# Patient Record
Sex: MALE | Race: WHITE | NOT HISPANIC OR LATINO | Employment: OTHER | URBAN - METROPOLITAN AREA
[De-identification: names, ages, dates, MRNs, and addresses within clinical notes are randomized per-mention and may not be internally consistent; named-entity substitution may affect disease eponyms.]

---

## 2017-01-07 ENCOUNTER — HOSPITAL ENCOUNTER (EMERGENCY)
Facility: MEDICAL CENTER | Age: 67
End: 2017-01-07
Attending: EMERGENCY MEDICINE
Payer: MEDICARE

## 2017-01-07 ENCOUNTER — APPOINTMENT (OUTPATIENT)
Dept: RADIOLOGY | Facility: MEDICAL CENTER | Age: 67
End: 2017-01-07
Attending: EMERGENCY MEDICINE
Payer: MEDICARE

## 2017-01-07 VITALS
SYSTOLIC BLOOD PRESSURE: 129 MMHG | DIASTOLIC BLOOD PRESSURE: 81 MMHG | OXYGEN SATURATION: 97 % | BODY MASS INDEX: 24.52 KG/M2 | TEMPERATURE: 98.3 F | HEIGHT: 72 IN | WEIGHT: 181 LBS | RESPIRATION RATE: 16 BRPM | HEART RATE: 67 BPM

## 2017-01-07 DIAGNOSIS — R51.9 ACUTE NONINTRACTABLE HEADACHE, UNSPECIFIED HEADACHE TYPE: ICD-10-CM

## 2017-01-07 PROCEDURE — 70450 CT HEAD/BRAIN W/O DYE: CPT

## 2017-01-07 PROCEDURE — 700102 HCHG RX REV CODE 250 W/ 637 OVERRIDE(OP): Performed by: EMERGENCY MEDICINE

## 2017-01-07 PROCEDURE — A9270 NON-COVERED ITEM OR SERVICE: HCPCS | Performed by: EMERGENCY MEDICINE

## 2017-01-07 PROCEDURE — 99283 EMERGENCY DEPT VISIT LOW MDM: CPT

## 2017-01-07 RX ORDER — BUTALBITAL, ACETAMINOPHEN AND CAFFEINE 50; 325; 40 MG/1; MG/1; MG/1
1 TABLET ORAL ONCE
Status: COMPLETED | OUTPATIENT
Start: 2017-01-07 | End: 2017-01-07

## 2017-01-07 RX ORDER — BUTALBITAL, ACETAMINOPHEN AND CAFFEINE 300; 40; 50 MG/1; MG/1; MG/1
1-2 CAPSULE ORAL EVERY 6 HOURS PRN
Qty: 12 CAP | Refills: 0 | Status: ON HOLD | OUTPATIENT
Start: 2017-01-07 | End: 2018-04-21

## 2017-01-07 RX ADMIN — BUTALBITAL, ACETAMINOPHEN, AND CAFFEINE 1 TABLET: 50; 325; 40 TABLET ORAL at 17:47

## 2017-01-07 ASSESSMENT — PAIN SCALES - GENERAL: PAINLEVEL_OUTOF10: ASSUMED PAIN PRESENT

## 2017-01-07 NOTE — ED AVS SNAPSHOT
1/7/2017          [address] [address]  [place] [address]    Dear Honorio:    [facility] wants to ensure your discharge home is safe and you or your loved ones have had all your questions answered regarding your care after you leave the hospital.    You may receive a telephone call within two days of your discharge.  This call is to make certain you understand your discharge instructions as well as ensure we provided you with the best care possible during your stay with us.     The call will only last approximately 3-5 minutes and will be done by a nurse.    Once again, we want to ensure your discharge home is safe and that you have a clear understanding of any next steps in your care.  If you have any questions or concerns, please do not hesitate to contact us, we are here for you.  Thank you for choosing [facility] for your healthcare needs.    Sincerely,    oLgan Dudley    [facility]

## 2017-01-07 NOTE — ED AVS SNAPSHOT
[employer] Access Code: [email or web address]  Expires: 2/6/2017  5:52 PM    Your email address is not on file at [facility].  Email Addresses are required for you to sign up for [employer], please contact [phone] to verify your personal information and to provide your email address prior to attempting to register for [employer].    Honorio Kimbrough  [address] [address]  [place] [address]    [employer]  A secure, online tool to manage your health information     [employer]’s [employer]® is a secure, online tool that connects you to your personalized health information from the privacy of your home -- day or night - making it very easy for you to manage your healthcare. Once the activation process is completed, you can even access your medical information using the [employer] susan, which is available for free in the Apple Susan store or Google Play store.     To learn more about [employer], visit [email or web address]    There are two levels of access available (as shown below):   My Chart Features  [facility] Primary Care Doctor [facility]  Specialists [facility]  Urgent  Care Non-[facility] Primary Care Doctor   Email your healthcare team securely and privately 24/7 X X X    Manage appointments: schedule your next appointment; view details of past/upcoming appointments X      Request prescription refills. X      View recent personal medical records, including lab and immunizations X X X X   View health record, including health history, allergies, medications X X X X   Read reports about your outpatient visits, procedures, consult and ER notes X X X X   See your discharge summary, which is a recap of your hospital and/or ER visit that includes your diagnosis, lab results, and care plan X X  X     How to register for [employer]:  Once your e-mail address has been verified, follow the following steps to sign up for [employer].     1. Go to  [email or web address]  2. Click on the Sign Up Now box, which takes you to the New Member Sign Up page. You will need to  provide the following information:  a. Enter your [employer] Access Code exactly as it appears at the top of this page. (You will not need to use this code after you’ve completed the sign-up process. If you do not sign up before the expiration date, you must request a new code.)   b. Enter your date of birth.   c. Enter your home email address.   d. Click Submit, and follow the next screen’s instructions.  3. Create a [employer] ID. This will be your [employer] login ID and cannot be changed, so think of one that is secure and easy to remember.  4. Create a [employer] password. You can change your password at any time.  5. Enter your Password Reset Question and Answer. This can be used at a later time if you forget your password.   6. Enter your e-mail address. This allows you to receive e-mail notifications when new information is available in [employer].  7. Click Sign Up. You can now view your health information.    For assistance activating your [employer] account, call [phone]

## 2017-01-08 NOTE — DISCHARGE INSTRUCTIONS
Return to the ER for fever, increased pain, vomiting, weakness, numbness, or other concerns  Follow-up with Dr. Rios or establish care with Dr. Lagunas from family medicine--call them on Monday to set up an appointment for Monday or Tuesday  Take Fioricet as prescribed and as needed for headache      General Headache Without Cause  A headache is pain or discomfort felt around the head or neck area. The specific cause of a headache may not be found. There are many causes and types of headaches. A few common ones are:  · Tension headaches.  · Migraine headaches.  · Cluster headaches.  · Chronic daily headaches.  HOME CARE INSTRUCTIONS   · Keep all follow-up appointments with your health care provider or any specialist referral.  · Only take over-the-counter or prescription medicines for pain or discomfort as directed by your health care provider.  · Lie down in a dark, quiet room when you have a headache.  · Keep a headache journal to find out what may trigger your migraine headaches. For example, write down:  ¨ What you eat and drink.  ¨ How much sleep you get.  ¨ Any change to your diet or medicines.  · Try massage or other relaxation techniques.  · Put ice packs or heat on the head and neck. Use these 3 to 4 times per day for 15 to 20 minutes each time, or as needed.  · Limit stress.  · Sit up straight, and do not tense your muscles.  · Quit smoking if you smoke.  · Limit alcohol use.  · Decrease the amount of caffeine you drink, or stop drinking caffeine.  · Eat and sleep on a regular schedule.  · Get 7 to 9 hours of sleep, or as recommended by your health care provider.  · Keep lights dim if bright lights bother you and make your headaches worse.  SEEK MEDICAL CARE IF:   · You have problems with the medicines you were prescribed.  · Your medicines are not working.  · You have a change from the usual headache.  · You have nausea or vomiting.  SEEK IMMEDIATE MEDICAL CARE IF:   · Your headache becomes  severe.  · You have a fever.  · You have a stiff neck.  · You have loss of vision.  · You have muscular weakness or loss of muscle control.  · You start losing your balance or have trouble walking.  · You feel faint or pass out.  · You have severe symptoms that are different from your first symptoms.     This information is not intended to replace advice given to you by your health care provider. Make sure you discuss any questions you have with your health care provider.     Document Released: 12/18/2006 Document Revised: 05/03/2016 Document Reviewed: 01/02/2013  [employer] Interactive Patient Education ©2016 [employer]

## 2017-01-08 NOTE — ED NOTES
Pt D/C to home. D/C instructions and prescriptions given to patient. Pt requesting tetnus shot, pt told to go to the health department for tetnus. Pt verbalizes understanding and has no further questions. Pt leaves ED with no acute changes, complaints or concerns.

## 2017-01-08 NOTE — ED PROVIDER NOTES
"ED Provider Note    CHIEF COMPLAINT  Chief Complaint   Patient presents with   • Head Ache       HPI  Honorio Kimbrough is a 66 y.o. male who presents complaining of headache.    Patient reports gradual onset of headache in the right frontal and facial area that feels pressure-like/heavy. Pain is nonradiating, mild to moderate, and without aggravating factors. He has taken one aspirin yesterday and one aspirin today without relief. He denies trauma, nausea, vomiting, new visual changes, fever, chills, neck stiffness, photophobia, weakness, chest pain, shortness of breath.    Patient states he has a daily headache \"before I take a shit.\"  This headache is different. He denies thunderclap headache.    Patient mentions he is here with his friend who has a foot infection and decided to be evaluated while he was here.    ALLERGIES  No Known Allergies    CURRENT MEDICATIONS  Home Medications     Reviewed by Jagdeep Morin (Pharmacy Tech) on 01/07/17 at 1631  Med List Status: Complete    Medication Last Dose Status          Patient Hernando Taking any Medications                        PAST MEDICAL HISTORY   has a past medical history of Cholesterol serum elevated; Enlarged tonsils; and Hearing disorder (5/20/2009).    SURGICAL HISTORY   has past surgical history that includes other orthopedic surgery and other.    SOCIAL HISTORY  Social History     Social History Main Topics   • Smoking status: Never Smoker    • Smokeless tobacco: Never Used   • Alcohol Use: Yes      Comment: occasional   • Drug Use: No   • Sexual Activity: Not on file       REVIEW OF SYSTEMS  See HPI for further details.  All other systems are negative except as above in HPI.    PHYSICAL EXAM  VITAL SIGNS: /81 mmHg  Pulse 67  Temp(Src) 36.8 °C (98.3 °F)  Resp 16  Ht 1.829 m (6')  Wt 82.1 kg (181 lb)  BMI 24.54 kg/m2  SpO2 97%    General:  WDWN, nontoxic appearing in NAD; A+Ox3; V/S as above  Skin: warm and dry; good color; no rash  HEENT: " NCAT; EOMs intact; PERRL; no scleral icterus; hearing aids noted  Neck: FROM; no meningismus, no LAD  Cardiovascular: Regular heart rate and rhythm.  No murmurs, rubs, or gallops; pulses 2+ bilaterally radially   Lungs: Clear to auscultation with good air movement bilaterally.  No wheezes, rhonchi, or rales.   Abdomen: BS present; soft; NTND; no rebound, guarding, or rigidity.  No organomegaly or pulsatile mass   Extremities: NOEL x 4; no e/o trauma; no pedal edema  Neurologic: CNs III-XII formally intact except for hearing deficit; speech intelligible, slightly impaired due to hearing impairment; distal sensation intact; strength 5/5 UE/LEs  Psychiatric: Appropriate affect, normal mood    IMAGING  CT-HEAD W/O   Final Result      1.  No acute intracranial abnormality.   2.  LEFT cochlear implant present with prior LEFT mastoidectomy.   3.  Fluid present in the remaining LEFT mastoid air cells, likely inflammatory and similar to prior.          MEDICAL RECORD  I have reviewed patient's medical record and pertinent results are listed below.      COURSE & MEDICAL DECISION MAKING  I have reviewed any medical record information, laboratory studies and radiographic results as noted.    oHnorio Kimbrough is a 66 y.o. male who presents complaining of headache. This was not thunderclap headache and has been present since yesterday. Patient does have headaches on a daily basis but this one was more constant. No focal neurologic findings or symptoms that are new for the patient. He has no fever, meningismus, or history of trauma.    CT head was obtained to evaluate for intracranial hemorrhage or space-occupying lesion. There is no acute abnormality present. Patient does not require LP, MRI or CTA imaging at this time.    We will discharge home with referral to his PCP or to establish care with Dr. Lagunas.  I will give him a prescription for Fioricet and have discussed return precautions including vomiting, fever, increased pain, or  any new neurologic symptoms.    Pt's blood pressure was noted to be above 120/80 here in the ER.  Pt was informed and advised to follow up as an outpatient for recheck for possible dx/management of hypertension.    I have reviewed the patient's narcotic Rx record.      FINAL IMPRESSION  1. Acute nonintractable headache, unspecified headache type        Electronically signed by: Lluvia Oquendo, 1/7/2017 4:23 PM

## 2017-01-08 NOTE — ED NOTES
Pt resting in bed. Pt given medications per MD order. Pt asking for a work note for yesterday and today, Verified with Dr. Rhodes that we cannot do that. Work note written for today.

## 2017-01-08 NOTE — ED NOTES
Pt states that he has had a headache on the right side of his head for the past 2 days. Pt has taken ASA but it has not helped. Pt states he has never had a headache last this long. Pt denies any numbness or tingling, denies any blurred or double vision that is out of the norm. No further needs noted. Pt placed in gown and given a warm blanket.

## 2017-01-09 ENCOUNTER — OFFICE VISIT (OUTPATIENT)
Dept: MEDICAL GROUP | Facility: CLINIC | Age: 67
End: 2017-01-09
Payer: MEDICARE

## 2017-01-09 VITALS
RESPIRATION RATE: 16 BRPM | TEMPERATURE: 97.6 F | OXYGEN SATURATION: 96 % | BODY MASS INDEX: 24.24 KG/M2 | DIASTOLIC BLOOD PRESSURE: 60 MMHG | HEIGHT: 72 IN | WEIGHT: 179 LBS | SYSTOLIC BLOOD PRESSURE: 152 MMHG | HEART RATE: 80 BPM

## 2017-01-09 DIAGNOSIS — R51.9 NONINTRACTABLE EPISODIC HEADACHE, UNSPECIFIED HEADACHE TYPE: ICD-10-CM

## 2017-01-09 DIAGNOSIS — R06.83 SNORING: ICD-10-CM

## 2017-01-09 DIAGNOSIS — Z12.11 COLON CANCER SCREENING: ICD-10-CM

## 2017-01-09 DIAGNOSIS — R52 PAIN: ICD-10-CM

## 2017-01-09 DIAGNOSIS — J34.2 DEVIATED SEPTUM: ICD-10-CM

## 2017-01-09 DIAGNOSIS — Z12.5 PROSTATE CANCER SCREENING: ICD-10-CM

## 2017-01-09 DIAGNOSIS — Z13.220 SCREENING, LIPID: ICD-10-CM

## 2017-01-09 PROCEDURE — 99213 OFFICE O/P EST LOW 20 MIN: CPT | Performed by: NURSE PRACTITIONER

## 2017-01-09 NOTE — MR AVS SNAPSHOT
Honorio Kimbrough   2017 1:00 PM   Office Visit   MRN: [id number]    Department:  [facility]   Dept Phone:  [phone]    Description:  Male : 1950   Provider:  KAREN Liao           Reason for Visit     Hospital Follow-up d/c [facility] for headache      Allergies as of 2017     No Known Allergies      You were diagnosed with     Nonintractable episodic headache, unspecified headache type   [9003633]       Snoring   [351887]       Deviated septum   [604711]       Prostate cancer screening   [149110]       Screening, lipid   [956665]       Pain   [782512]       Colon cancer screening   [512025]         Vital Signs     Blood Pressure Pulse Temperature Respirations Height Weight    152/60 mmHg 80 36.4 °C (97.6 °F) 16 1.829 m (6') 81.194 kg (179 lb)    Body Mass Index Oxygen Saturation Smoking Status             24.27 kg/m2 96% Never Smoker          Basic Information     Date Of Birth Sex Race Ethnicity Preferred Language    1950 Male White Non- English      Problem List              ICD-10-CM Priority Class Noted - Resolved    Hearing disorder (Chronic) H93.299   2009 - Present    Nonintractable episodic headache R51   2017 - Present    Snoring R06.83   2017 - Present      Health Maintenance        Date Due Completion Dates    COLONOSCOPY 5/10/2001 ---    IMM DTaP/Tdap/Td Vaccine (1 - Tdap) 2008    IMM ZOSTER VACCINE 5/10/2011 ---    IMM PNEUMOCOCCAL 65+ (ADULT) LOW/MEDIUM RISK SERIES (1 of 2 - PCV13) 5/10/2016 ---    IMM INFLUENZA (1) 2016 ---            Current Immunizations     TD Vaccine 2008 11:23 PM      Below and/or attached are the medications your provider expects you to take. Review all of your home medications and newly ordered medications with your provider and/or pharmacist. Follow medication instructions as directed by your provider and/or pharmacist. Please keep your medication list with you and share with  your provider. Update the information when medications are discontinued, doses are changed, or new medications (including over-the-counter products) are added; and carry medication information at all times in the event of emergency situations     Allergies:  No Known Allergies          Medications  Valid as of: January 09, 2017 -  1:38 PM    Generic Name Brand Name Tablet Size Instructions for use    Butalbital-APAP-Caffeine (Cap) FIORICET -40 MG Take 1-2 Caps by mouth every 6 hours as needed for Headache.        .                 Medicines prescribed today were sent to:     [facility] [address] [place] [address], [place] - [address] [place] [address]    Phone: [phone] Fax: [phone]    Open 24 Hours?: No      Medication refill instructions:       If your prescription bottle indicates you have medication refills left, it is not necessary to call your provider’s office. Please contact your pharmacy and they will refill your medication.    If your prescription bottle indicates you do not have any refills left, you may request refills at any time through one of the following ways: The online [employer] system (except Urgent Care), by calling your provider’s office, or by asking your pharmacy to contact your provider’s office with a refill request. Medication refills are processed only during regular business hours and may not be available until the next business day. Your provider may request additional information or to have a follow-up visit with you prior to refilling your medication.   *Please Note: Medication refills are assigned a new Rx number when refilled electronically. Your pharmacy may indicate that no refills were authorized even though a new prescription for the same medication is available at the pharmacy. Please request the medicine by name with the pharmacy before contacting your provider for a refill.        Your To Do List     Future Labs/Procedures Complete By Expires    CBC WITH  DIFFERENTIAL  As directed 1/10/2018    COMP METABOLIC PANEL  As directed 1/10/2018    LIPID PROFILE  As directed 1/10/2018    OCCULT BLOOD FECES IMMUNOASSAY  As directed 1/10/2018    PROSTATE SPECIFIC AG SCREENING  As directed 1/10/2018    TSH  As directed 1/10/2018      Referral     A referral request has been sent to our patient care coordination department. Please allow 3-5 business days for us to process this request and contact you either by phone or mail. If you do not hear from us by the 5th business day, please call us at [phone].           MyChart Status: Patient Declined

## 2017-01-09 NOTE — PROGRESS NOTES
"CC: Hospital Follow-up        HPI:     Honorio presents today for the followin. Nonintractable episodic headache, unspecified headache type/Pain   Here for ER follow-up. States that he eaten some Chinese food which included salmon with a lot of \"sauce on it\". States the next morning he woke up with a headache most significant on the right side and he felt he had associated numbness on his face. Was driving his friend to the ER so he went along as well and had negative CT of the head was diagnosed with a routine headache. Given Fioricet. This helped his headache greatly. Was given a prescription for small supply which she has not needed to fill. He states on routine he wakes up with headaches and fill them as he sleeping as well. He does snore. To his knowledge he may or may not of then tested for sleep apnea previously but if he has been up was decades ago.  Symptoms are chronic. Not new. Occasionally associated with tingling or numbness on the right side of face. He has been several hospitals for these symptoms. One of which was in [place]  Headaches are bilateral, occasionally worse on the right side. No significant changes in vision. As stated he is had 2 negative CT/MRIs of his head. [facility] at [facility] for similar symptoms. I do not have access to these records.    He does seem to think that this could be related to elevated blood pressure. He does not monitor his blood pressure at home. He does not have access to a blood pressure cuff. His blood pressures have been normal in the office. They were normal in the emergency room as well.    Headaches self resolve after he has a bowel movement in the morning    3. Snoring/ Deviated septum  As stated above patient has significant snoring issues. He attributes this to a deviated septum when his nose is broken several decades ago at work. He does seem to be fairly focused on wanting to fix his nose        Current Outpatient Prescriptions "   Medication Sig Dispense Refill   • acetaminophen/caffeine/butalbital 300-40-50 mg (FIORICET) -40 MG Cap capsule Take 1-2 Caps by mouth every 6 hours as needed for Headache. 12 Cap 0     No current facility-administered medications for this visit.     Social History   Substance Use Topics   • Smoking status: Never Smoker    • Smokeless tobacco: Never Used   • Alcohol Use: Yes      Comment: occasional     I reviewed patients allergies, problem list and medications today in EPIC.    ROS: Any/all pertinent positives listed in the HPI, otherwise all others reviewed are negative today.      /60 mmHg  Pulse 80  Temp(Src) 36.4 °C (97.6 °F)  Resp 16  Ht 1.829 m (6')  Wt 81.194 kg (179 lb)  BMI 24.27 kg/m2  SpO2 96%    Exam:   Gen: Alert and oriented, No apparent distress. WDWN  Psych: A+Ox3, normal affect and mood  Skin: Warm, dry and intact. Good turgor   No rashes in visible areas.  Eye: Conjunctiva clear, lids normal  ENMT: Lips without lesions, good dentition  Lungs: Unlabored respiratory effort.   Normal facial movement  Cochlear implant and hearing aid      Assessment and Plan.   66 y.o. male with the following issues.    1. Nonintractable episodic headache, unspecified headache type  Stable. Currently asymptomatic.  - COMP METABOLIC PANEL; Future  - CBC WITH DIFFERENTIAL; Future  - TSH; Future    2. Pain   As above  - TSH; Future    3. Snoring  /Deviated septum  Referral to ENT to evaluate for surgical intervention for his deviated septum. Referral for overnight desaturation study  - REFERRAL TO ENT    5. Prostate cancer screening  Ordered  - PROSTATE SPECIFIC AG SCREENING; Future    6. Screening, lipid  Ordered  - LIPID PROFILE; Future    7. Colon cancer screening  Ordered  - OCCULT BLOOD FECES IMMUNOASSAY; Future

## 2017-01-10 ENCOUNTER — HOSPITAL ENCOUNTER (OUTPATIENT)
Dept: LAB | Facility: MEDICAL CENTER | Age: 67
End: 2017-01-10
Attending: NURSE PRACTITIONER
Payer: MEDICARE

## 2017-01-10 DIAGNOSIS — Z13.220 SCREENING, LIPID: ICD-10-CM

## 2017-01-10 DIAGNOSIS — R52 PAIN: ICD-10-CM

## 2017-01-10 DIAGNOSIS — R51.9 NONINTRACTABLE EPISODIC HEADACHE, UNSPECIFIED HEADACHE TYPE: ICD-10-CM

## 2017-01-10 DIAGNOSIS — Z12.5 PROSTATE CANCER SCREENING: ICD-10-CM

## 2017-01-10 LAB
ALBUMIN SERPL BCP-MCNC: 4.6 G/DL (ref 3.2–4.9)
ALBUMIN/GLOB SERPL: 1.4 G/DL
ALP SERPL-CCNC: 55 U/L (ref 30–99)
ALT SERPL-CCNC: 21 U/L (ref 2–50)
ANION GAP SERPL CALC-SCNC: 6 MMOL/L (ref 0–11.9)
AST SERPL-CCNC: 19 U/L (ref 12–45)
BASOPHILS # BLD AUTO: 0.06 K/UL (ref 0–0.12)
BASOPHILS NFR BLD AUTO: 1.2 % (ref 0–1.8)
BILIRUB SERPL-MCNC: 0.9 MG/DL (ref 0.1–1.5)
BUN SERPL-MCNC: 17 MG/DL (ref 8–22)
CALCIUM SERPL-MCNC: 9.5 MG/DL (ref 8.5–10.5)
CHLORIDE SERPL-SCNC: 102 MMOL/L (ref 96–112)
CHOLEST SERPL-MCNC: 230 MG/DL (ref 100–199)
CO2 SERPL-SCNC: 29 MMOL/L (ref 20–33)
CREAT SERPL-MCNC: 1.13 MG/DL (ref 0.5–1.4)
EOSINOPHIL # BLD: 0.16 K/UL (ref 0–0.51)
EOSINOPHIL NFR BLD AUTO: 3.1 % (ref 0–6.9)
ERYTHROCYTE [DISTWIDTH] IN BLOOD BY AUTOMATED COUNT: 49.5 FL (ref 35.9–50)
GLOBULIN SER CALC-MCNC: 3.4 G/DL (ref 1.9–3.5)
GLUCOSE SERPL-MCNC: 93 MG/DL (ref 65–99)
HCT VFR BLD AUTO: 49.7 % (ref 42–52)
HDLC SERPL-MCNC: 50 MG/DL
HGB BLD-MCNC: 16.7 G/DL (ref 14–18)
IMM GRANULOCYTES # BLD AUTO: 0.01 K/UL (ref 0–0.11)
IMM GRANULOCYTES NFR BLD AUTO: 0.2 % (ref 0–0.9)
LDLC SERPL CALC-MCNC: 162 MG/DL
LYMPHOCYTES # BLD: 1.37 K/UL (ref 1–4.8)
LYMPHOCYTES NFR BLD AUTO: 26.8 % (ref 22–41)
MCH RBC QN AUTO: 34.6 PG (ref 27–33)
MCHC RBC AUTO-ENTMCNC: 33.6 G/DL (ref 33.7–35.3)
MCV RBC AUTO: 102.9 FL (ref 81.4–97.8)
MONOCYTES # BLD: 0.47 K/UL (ref 0–0.85)
MONOCYTES NFR BLD AUTO: 9.2 % (ref 0–13.4)
NEUTROPHILS # BLD: 3.05 K/UL (ref 1.82–7.42)
NEUTROPHILS NFR BLD AUTO: 59.5 % (ref 44–72)
NRBC # BLD AUTO: 0 K/UL
NRBC BLD-RTO: 0 /100 WBC
PLATELET # BLD AUTO: 167 K/UL (ref 164–446)
PMV BLD AUTO: 11.3 FL (ref 9–12.9)
POTASSIUM SERPL-SCNC: 4.1 MMOL/L (ref 3.6–5.5)
PROT SERPL-MCNC: 8 G/DL (ref 6–8.2)
PSA SERPL DL<=0.01 NG/ML-MCNC: 1.55 NG/ML (ref 0–4)
RBC # BLD AUTO: 4.83 M/UL (ref 4.7–6.1)
SODIUM SERPL-SCNC: 137 MMOL/L (ref 135–145)
TRIGL SERPL-MCNC: 91 MG/DL (ref 0–149)
TSH SERPL DL<=0.005 MIU/L-ACNC: 3.28 UIU/ML (ref 0.3–3.7)
WBC # BLD AUTO: 5.1 K/UL (ref 4.8–10.8)

## 2017-01-10 PROCEDURE — 85025 COMPLETE CBC W/AUTO DIFF WBC: CPT

## 2017-01-10 PROCEDURE — 80061 LIPID PANEL: CPT | Mod: GA

## 2017-01-10 PROCEDURE — 84443 ASSAY THYROID STIM HORMONE: CPT

## 2017-01-10 PROCEDURE — 36415 COLL VENOUS BLD VENIPUNCTURE: CPT

## 2017-01-10 PROCEDURE — 80053 COMPREHEN METABOLIC PANEL: CPT

## 2017-01-10 PROCEDURE — 84153 ASSAY OF PSA TOTAL: CPT

## 2017-01-17 ENCOUNTER — TELEPHONE (OUTPATIENT)
Dept: MEDICAL GROUP | Facility: CLINIC | Age: 67
End: 2017-01-17

## 2017-01-17 ENCOUNTER — OFFICE VISIT (OUTPATIENT)
Dept: MEDICAL GROUP | Facility: CLINIC | Age: 67
End: 2017-01-17
Payer: MEDICARE

## 2017-01-17 VITALS
SYSTOLIC BLOOD PRESSURE: 136 MMHG | OXYGEN SATURATION: 98 % | RESPIRATION RATE: 20 BRPM | DIASTOLIC BLOOD PRESSURE: 78 MMHG | TEMPERATURE: 98.4 F | HEIGHT: 72 IN | WEIGHT: 177 LBS | BODY MASS INDEX: 23.98 KG/M2 | HEART RATE: 105 BPM

## 2017-01-17 DIAGNOSIS — R51.9 CHRONIC NONINTRACTABLE HEADACHE, UNSPECIFIED HEADACHE TYPE: ICD-10-CM

## 2017-01-17 DIAGNOSIS — R06.83 SNORING: ICD-10-CM

## 2017-01-17 DIAGNOSIS — E78.5 HYPERLIPIDEMIA, UNSPECIFIED HYPERLIPIDEMIA TYPE: ICD-10-CM

## 2017-01-17 DIAGNOSIS — G89.29 CHRONIC NONINTRACTABLE HEADACHE, UNSPECIFIED HEADACHE TYPE: ICD-10-CM

## 2017-01-17 DIAGNOSIS — R51.9 NONINTRACTABLE EPISODIC HEADACHE, UNSPECIFIED HEADACHE TYPE: ICD-10-CM

## 2017-01-17 PROCEDURE — 99214 OFFICE O/P EST MOD 30 MIN: CPT | Performed by: NURSE PRACTITIONER

## 2017-01-17 RX ORDER — AMITRIPTYLINE HYDROCHLORIDE 25 MG/1
25 TABLET, FILM COATED ORAL
Qty: 30 TAB | Refills: 5 | Status: SHIPPED | OUTPATIENT
Start: 2017-01-17 | End: 2018-04-20 | Stop reason: CLARIF

## 2017-01-17 NOTE — Clinical Note
January 17, 2017         Patient: Honorio Kimbrough   YOB: 1950   Date of Visit: 1/17/2017           To Whom it May Concern:    Honorio Kimbrough was seen in my clinic on 1/17/2017. He is able to drive.    If you have any questions or concerns, please don't hesitate to call.        Sincerely,           KAREN Liao  Electronically Signed

## 2017-01-17 NOTE — PROGRESS NOTES
"CC: Follow-Up        HPI:     Honorio is a patient of Dr. Rios presents today for the followin. Nonintractable episodic headache, unspecified headache type/Chronic nonintractable headache, unspecified headache type  He is here today following up for headaches. He tells me he has daily headache. Often starts in the middle the night and improves after he has a bowel movement. He then also tells me that he has a headache after every meal?   Of note, He is a somewhat of a poor historian terms of history, presentation etc. regarding his headaches. No reports of new or alarming symptoms.  He did have a negative CT scan of the head and  in the ER. He also still needs had a normal head scan at [facility] recently as well. He was given Fioricet however he is not taking it because he wants to see what will happen with his headaches without it.  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 He drives vehicles part-time for work. I do not see that he was told to stop driving. He denies that he was told to stop driving.  He did this because he felt that his headaches were bothersome.     He believes his headaches are related to blood pressure. He has been unable to monitor his blood pressure at home  She describes the pain as had his \"pressure\". Seems to be globalized throughout the head.      3. Snoring  We discussed his snoring. This was discussed at last visit as well. 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       Current Outpatient Prescriptions   Medication Sig Dispense Refill   • amitriptyline (ELAVIL) 25 MG Tab Take 1 Tab by mouth every bedtime. 30 Tab 5   • acetaminophen/caffeine/butalbital 300-40-50 mg (FIORICET) -40 MG Cap capsule Take 1-2 Caps by mouth every " "6 hours as needed for Headache. 12 Cap 0     No current facility-administered medications for this visit.     Social History   Substance Use Topics   • Smoking status: Never Smoker    • Smokeless tobacco: Never Used   • Alcohol Use: Yes      Comment: occasional     I reviewed patients allergies, problem list and medications today in [facility].    ROS: Any/all pertinent positives listed in the HPI, otherwise all others reviewed are negative today.      /78 mmHg  Pulse 105  Temp(Src) 36.9 °C (98.4 °F)  Resp 20  Ht 1.829 m (6' 0.01\")  Wt 80.287 kg (177 lb)  BMI 24.00 kg/m2  SpO2 98%    Exam:   Gen: Alert and oriented, No apparent distress. WDWN  Psych: A+Ox3, normal affect and mood  Skin: Warm, dry and intact. Good turgor   No rashes in visible areas.  Eye: Conjunctiva clear, lids normal  ENMT: Lips without lesions, dentures  Lungs: Clear to auscultation bilaterally, no rales or rhonchi   Unlabored respiratory effort.   CV: Regular rate and rhythm, S1, S2. No murmurs.   No Edema  Patient does have distorted speech which is chronic related to a hearing impediment. Normal facial movement.  Congenital Hearing loss. Cochlear implant      Assessment and Plan.   66 y.o. male with the following issues.    1. Nonintractable episodic headache, unspecified headache type/Chronic nonintractable headache, unspecified headache type  Stable. Chronic. Unknown etiology. Negative CT scan of the head. Trial of low-dose amitriptyline. He's given a headache diary-discussed the importance of being able to paint a clear picture at his follow-up. I will refer him to neurology. He does have a prescription for a few or set to try and I do encourage him to try it.  Reassurance blood pressure normal today.   - amitriptyline (ELAVIL) 25 MG Tab; Take 1 Tab by mouth every bedtime.  Dispense: 30 Tab; Refill: 5  - REFERRAL TO NEUROLOGY    3. Snoring  Will evaluate for sleep apnea.     3. Hyperlipidemia  Stable. We did discuss diet and " exercise. Recommend rechecking in 3-6 month with his PCP to evaluate if he needs medication management

## 2017-01-17 NOTE — TELEPHONE ENCOUNTER
Left message to let pt know that his letter for work is ready for  at . We can also mail it to him if he wishes.

## 2017-01-17 NOTE — MR AVS SNAPSHOT
"        Honorio Kimbrough   2017 12:20 PM   Office Visit   MRN: [id number]    Department:  [facility]   Dept Phone:  [phone]    Description:  Male : 1950   Provider:  KAREN Liao           Reason for Visit     Follow-Up [facility] to labs       Allergies as of 2017     No Known Allergies      You were diagnosed with     Nonintractable episodic headache, unspecified headache type   [5113099]       Chronic nonintractable headache, unspecified headache type   [5405765]       Snoring   [[id number]]         Vital Signs     Blood Pressure Pulse Temperature Respirations Height Weight    136/78 mmHg 105 36.9 °C (98.4 °F) 20 1.829 m (6' 0.01\") 80.287 kg (177 lb)    Body Mass Index Oxygen Saturation Smoking Status             24.00 kg/m2 98% Never Smoker          Basic Information     Date Of Birth Sex Race Ethnicity Preferred Language    1950 Male White Non- English      Problem List              ICD-10-CM Priority Class Noted - Resolved    Hearing disorder (Chronic) H93.299   2009 - Present    Nonintractable episodic headache R51   2017 - Present    Snoring R06.83   2017 - Present      Health Maintenance        Date Due Completion Dates    COLONOSCOPY 5/10/2001 ---    IMM DTaP/Tdap/Td Vaccine (1 - Tdap) 2008    IMM ZOSTER VACCINE 5/10/2011 ---    IMM PNEUMOCOCCAL 65+ (ADULT) LOW/MEDIUM RISK SERIES (1 of 2 - PCV13) 5/10/2016 ---    IMM INFLUENZA (1) 2016 ---            Current Immunizations     TD Vaccine 2008 11:23 PM      Below and/or attached are the medications your provider expects you to take. Review all of your home medications and newly ordered medications with your provider and/or pharmacist. Follow medication instructions as directed by your provider and/or pharmacist. Please keep your medication list with you and share with your provider. Update the information when medications are discontinued, doses are changed, or new medications " (including over-the-counter products) are added; and carry medication information at all times in the event of emergency situations     Allergies:  No Known Allergies          Medications  Valid as of: January 17, 2017 -  1:30 PM    Generic Name Brand Name Tablet Size Instructions for use    Amitriptyline HCl (Tab) ELAVIL 25 MG Take 1 Tab by mouth every bedtime.        Butalbital-APAP-Caffeine (Cap) FIORICET -40 MG Take 1-2 Caps by mouth every 6 hours as needed for Headache.        .                 Medicines prescribed today were sent to:     [facility] [address] [place] [address], [place] - [address] [place] [address]    Phone: [phone] Fax: [phone]    Open 24 Hours?: No      Medication refill instructions:       If your prescription bottle indicates you have medication refills left, it is not necessary to call your provider’s office. Please contact your pharmacy and they will refill your medication.    If your prescription bottle indicates you do not have any refills left, you may request refills at any time through one of the following ways: The online [employer] system (except Urgent Care), by calling your provider’s office, or by asking your pharmacy to contact your provider’s office with a refill request. Medication refills are processed only during regular business hours and may not be available until the next business day. Your provider may request additional information or to have a follow-up visit with you prior to refilling your medication.   *Please Note: Medication refills are assigned a new Rx number when refilled electronically. Your pharmacy may indicate that no refills were authorized even though a new prescription for the same medication is available at the pharmacy. Please request the medicine by name with the pharmacy before contacting your provider for a refill.        Referral     A referral request has been sent to our patient care coordination department. Please allow 3-5  business days for us to process this request and contact you either by phone or mail. If you do not hear from us by the 5th business day, please call us at [phone].           MyChart Status: Patient Declined

## 2017-01-18 ENCOUNTER — TELEPHONE (OUTPATIENT)
Dept: MEDICAL GROUP | Facility: CLINIC | Age: 67
End: 2017-01-18

## 2017-01-18 NOTE — TELEPHONE ENCOUNTER
1. 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  He would like to get it faxed to [phone] Recipient Felisa Wilkins. He is hard of hearing and would prefer it to be faxed

## 2017-01-20 ENCOUNTER — TELEPHONE (OUTPATIENT)
Dept: MEDICAL GROUP | Facility: CLINIC | Age: 67
End: 2017-01-20

## 2017-01-20 NOTE — Clinical Note
January 20, 2017        Patient: Honorio Kimbrough   YOB: 1950   Date of Visit: 1/20/2017           To Whom It May Concern:    Honorio Kimbrough was seen in my clinic on 1/17/2017. He is able to drive commercial vehicles.    If you have any questions or concerns, please don't hesitate to call.        Sincerely,          Wayne Rios D.O.      [address]  [facility] [id number] [phone] (Phone)  [phone] (Fax)

## 2017-01-20 NOTE — TELEPHONE ENCOUNTER
1. Caller Name: Honorio Hanson                      Call Back Number: [phone] (home)     2. 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 Otherwise his job doesn't let him back to work. Please refax to  [phone] Recipient Felisa Wilkins    3. Patient approves office to leave a detailed voicemail/MyChart message: yes

## 2017-01-23 ENCOUNTER — TELEPHONE (OUTPATIENT)
Dept: MEDICAL GROUP | Facility: CLINIC | Age: 67
End: 2017-01-23

## 2017-01-23 NOTE — TELEPHONE ENCOUNTER
Phone Number Called: [phone]       Message: Lm for Ekta Barahona (patient's boss) asking to please call us back to see what else is needed for patient to go back to work.  Previous letter not enough.    Left Message for patient to call back: yes

## 2017-02-07 ENCOUNTER — TELEPHONE (OUTPATIENT)
Dept: MEDICAL GROUP | Facility: CLINIC | Age: 67
End: 2017-02-07

## 2017-02-07 NOTE — TELEPHONE ENCOUNTER
1. Caller Name: Kiel A +                                             Patient refusing apnea study at this time due to being homeless

## 2018-04-20 ENCOUNTER — RESOLUTE PROFESSIONAL BILLING HOSPITAL PROF FEE (OUTPATIENT)
Dept: HOSPITALIST | Facility: MEDICAL CENTER | Age: 68
End: 2018-04-20

## 2018-04-20 ENCOUNTER — HOSPITAL ENCOUNTER (INPATIENT)
Facility: MEDICAL CENTER | Age: 68
LOS: 1 days | DRG: 307 | End: 2018-04-22
Attending: EMERGENCY MEDICINE | Admitting: HOSPITALIST
Payer: MEDICARE

## 2018-04-20 ENCOUNTER — OFFICE VISIT (OUTPATIENT)
Dept: URGENT CARE | Facility: CLINIC | Age: 68
End: 2018-04-20

## 2018-04-20 ENCOUNTER — APPOINTMENT (OUTPATIENT)
Dept: RADIOLOGY | Facility: MEDICAL CENTER | Age: 68
DRG: 307 | End: 2018-04-20
Attending: EMERGENCY MEDICINE
Payer: MEDICARE

## 2018-04-20 ENCOUNTER — APPOINTMENT (OUTPATIENT)
Dept: RADIOLOGY | Facility: IMAGING CENTER | Age: 68
End: 2018-04-20
Attending: PHYSICIAN ASSISTANT

## 2018-04-20 VITALS
BODY MASS INDEX: 24.79 KG/M2 | TEMPERATURE: 99.3 F | RESPIRATION RATE: 16 BRPM | WEIGHT: 183 LBS | DIASTOLIC BLOOD PRESSURE: 80 MMHG | HEART RATE: 95 BPM | SYSTOLIC BLOOD PRESSURE: 120 MMHG | OXYGEN SATURATION: 95 % | HEIGHT: 72 IN

## 2018-04-20 DIAGNOSIS — J90 PLEURAL EFFUSION: ICD-10-CM

## 2018-04-20 DIAGNOSIS — R06.02 SOB (SHORTNESS OF BREATH): ICD-10-CM

## 2018-04-20 DIAGNOSIS — J90 PLEURAL EFFUSION ON RIGHT: Primary | ICD-10-CM

## 2018-04-20 DIAGNOSIS — R06.00 DYSPNEA, UNSPECIFIED TYPE: ICD-10-CM

## 2018-04-20 PROBLEM — D53.9 MACROCYTIC ANEMIA: Status: ACTIVE | Noted: 2018-04-20

## 2018-04-20 PROBLEM — I71.21 ANEURYSM OF ASCENDING AORTA (HCC): Status: ACTIVE | Noted: 2018-04-20

## 2018-04-20 PROBLEM — N17.9 AKI (ACUTE KIDNEY INJURY) (HCC): Status: ACTIVE | Noted: 2018-04-20

## 2018-04-20 LAB
ALBUMIN SERPL BCP-MCNC: 3.7 G/DL (ref 3.2–4.9)
ALBUMIN/GLOB SERPL: 1.4 G/DL
ALP SERPL-CCNC: 62 U/L (ref 30–99)
ALT SERPL-CCNC: 42 U/L (ref 2–50)
ANION GAP SERPL CALC-SCNC: 3 MMOL/L (ref 0–11.9)
APPEARANCE UR: CLEAR
AST SERPL-CCNC: 30 U/L (ref 12–45)
BASOPHILS # BLD AUTO: 0.9 % (ref 0–1.8)
BASOPHILS # BLD: 0.04 K/UL (ref 0–0.12)
BILIRUB SERPL-MCNC: 0.8 MG/DL (ref 0.1–1.5)
BILIRUB UR QL STRIP.AUTO: NEGATIVE
BNP SERPL-MCNC: 1264 PG/ML (ref 0–100)
BUN SERPL-MCNC: 26 MG/DL (ref 8–22)
CALCIUM SERPL-MCNC: 8.5 MG/DL (ref 8.4–10.2)
CHLORIDE SERPL-SCNC: 108 MMOL/L (ref 96–112)
CHLORIDE UR-SCNC: 124 MMOL/L
CO2 SERPL-SCNC: 24 MMOL/L (ref 20–33)
COLOR UR: YELLOW
CREAT SERPL-MCNC: 1.34 MG/DL (ref 0.5–1.4)
CREAT UR-MCNC: 6.3 MG/DL
EKG 4674: NORMAL
EKG IMPRESSION: NORMAL
EOSINOPHIL # BLD AUTO: 0.11 K/UL (ref 0–0.51)
EOSINOPHIL NFR BLD: 2.4 % (ref 0–6.9)
ERYTHROCYTE [DISTWIDTH] IN BLOOD BY AUTOMATED COUNT: 53.1 FL (ref 35.9–50)
GLOBULIN SER CALC-MCNC: 2.7 G/DL (ref 1.9–3.5)
GLUCOSE SERPL-MCNC: 107 MG/DL (ref 65–99)
GLUCOSE UR STRIP.AUTO-MCNC: NEGATIVE MG/DL
HCT VFR BLD AUTO: 37.1 % (ref 42–52)
HGB BLD-MCNC: 12.6 G/DL (ref 14–18)
IMM GRANULOCYTES # BLD AUTO: 0.01 K/UL (ref 0–0.11)
IMM GRANULOCYTES NFR BLD AUTO: 0.2 % (ref 0–0.9)
KETONES UR STRIP.AUTO-MCNC: NEGATIVE MG/DL
LEUKOCYTE ESTERASE UR QL STRIP.AUTO: NEGATIVE
LYMPHOCYTES # BLD AUTO: 1.16 K/UL (ref 1–4.8)
LYMPHOCYTES NFR BLD: 25.1 % (ref 22–41)
MCH RBC QN AUTO: 34.4 PG (ref 27–33)
MCHC RBC AUTO-ENTMCNC: 34 G/DL (ref 33.7–35.3)
MCV RBC AUTO: 101.4 FL (ref 81.4–97.8)
MICRO URNS: NORMAL
MONOCYTES # BLD AUTO: 0.42 K/UL (ref 0–0.85)
MONOCYTES NFR BLD AUTO: 9.1 % (ref 0–13.4)
NEUTROPHILS # BLD AUTO: 2.89 K/UL (ref 1.82–7.42)
NEUTROPHILS NFR BLD: 62.3 % (ref 44–72)
NITRITE UR QL STRIP.AUTO: NEGATIVE
NRBC # BLD AUTO: 0 K/UL
NRBC BLD-RTO: 0 /100 WBC
PH UR STRIP.AUTO: 5 [PH]
PLATELET # BLD AUTO: 165 K/UL (ref 164–446)
PMV BLD AUTO: 11 FL (ref 9–12.9)
POTASSIUM SERPL-SCNC: 3.8 MMOL/L (ref 3.6–5.5)
POTASSIUM UR-SCNC: 8.5 MMOL/L
PROT SERPL-MCNC: 6.4 G/DL (ref 6–8.2)
PROT UR QL STRIP: NEGATIVE MG/DL
PROT UR-MCNC: <4 MG/DL (ref 0–15)
RBC # BLD AUTO: 3.66 M/UL (ref 4.7–6.1)
RBC UR QL AUTO: NEGATIVE
SODIUM SERPL-SCNC: 135 MMOL/L (ref 135–145)
SODIUM UR-SCNC: 118 MMOL/L
SP GR UR STRIP.AUTO: <=1.005
TROPONIN I SERPL-MCNC: 0.04 NG/ML (ref 0–0.04)
TSH SERPL DL<=0.005 MIU/L-ACNC: 3.3 UIU/ML (ref 0.38–5.33)
WBC # BLD AUTO: 4.6 K/UL (ref 4.8–10.8)

## 2018-04-20 PROCEDURE — 700111 HCHG RX REV CODE 636 W/ 250 OVERRIDE (IP): Performed by: INTERNAL MEDICINE

## 2018-04-20 PROCEDURE — 82436 ASSAY OF URINE CHLORIDE: CPT

## 2018-04-20 PROCEDURE — 93000 ELECTROCARDIOGRAM COMPLETE: CPT | Performed by: PHYSICIAN ASSISTANT

## 2018-04-20 PROCEDURE — 93010 ELECTROCARDIOGRAM REPORT: CPT | Performed by: INTERNAL MEDICINE

## 2018-04-20 PROCEDURE — 700117 HCHG RX CONTRAST REV CODE 255: Performed by: EMERGENCY MEDICINE

## 2018-04-20 PROCEDURE — 71046 X-RAY EXAM CHEST 2 VIEWS: CPT | Mod: TC,FY | Performed by: PHYSICIAN ASSISTANT

## 2018-04-20 PROCEDURE — 94760 N-INVAS EAR/PLS OXIMETRY 1: CPT

## 2018-04-20 PROCEDURE — 84156 ASSAY OF PROTEIN URINE: CPT

## 2018-04-20 PROCEDURE — 93005 ELECTROCARDIOGRAM TRACING: CPT | Performed by: INTERNAL MEDICINE

## 2018-04-20 PROCEDURE — 80053 COMPREHEN METABOLIC PANEL: CPT

## 2018-04-20 PROCEDURE — 81003 URINALYSIS AUTO W/O SCOPE: CPT

## 2018-04-20 PROCEDURE — G0378 HOSPITAL OBSERVATION PER HR: HCPCS

## 2018-04-20 PROCEDURE — 89190 NASAL SMEAR FOR EOSINOPHILS: CPT

## 2018-04-20 PROCEDURE — 96374 THER/PROPH/DIAG INJ IV PUSH: CPT

## 2018-04-20 PROCEDURE — 99215 OFFICE O/P EST HI 40 MIN: CPT | Performed by: PHYSICIAN ASSISTANT

## 2018-04-20 PROCEDURE — 85025 COMPLETE CBC W/AUTO DIFF WBC: CPT

## 2018-04-20 PROCEDURE — 71275 CT ANGIOGRAPHY CHEST: CPT

## 2018-04-20 PROCEDURE — 82570 ASSAY OF URINE CREATININE: CPT

## 2018-04-20 PROCEDURE — 84484 ASSAY OF TROPONIN QUANT: CPT

## 2018-04-20 PROCEDURE — 84300 ASSAY OF URINE SODIUM: CPT

## 2018-04-20 PROCEDURE — 83880 ASSAY OF NATRIURETIC PEPTIDE: CPT

## 2018-04-20 PROCEDURE — 84133 ASSAY OF URINE POTASSIUM: CPT

## 2018-04-20 PROCEDURE — 700111 HCHG RX REV CODE 636 W/ 250 OVERRIDE (IP)

## 2018-04-20 PROCEDURE — 84443 ASSAY THYROID STIM HORMONE: CPT

## 2018-04-20 PROCEDURE — 99285 EMERGENCY DEPT VISIT HI MDM: CPT

## 2018-04-20 PROCEDURE — 99220 PR INITIAL OBSERVATION CARE,LEVL III: CPT | Performed by: INTERNAL MEDICINE

## 2018-04-20 RX ORDER — LISINOPRIL 5 MG/1
10 TABLET ORAL
Status: DISCONTINUED | OUTPATIENT
Start: 2018-04-21 | End: 2018-04-21

## 2018-04-20 RX ORDER — FUROSEMIDE 10 MG/ML
INJECTION INTRAMUSCULAR; INTRAVENOUS
Status: COMPLETED
Start: 2018-04-20 | End: 2018-04-20

## 2018-04-20 RX ORDER — AMOXICILLIN 250 MG
2 CAPSULE ORAL 2 TIMES DAILY
Status: DISCONTINUED | OUTPATIENT
Start: 2018-04-20 | End: 2018-04-22 | Stop reason: HOSPADM

## 2018-04-20 RX ORDER — FUROSEMIDE 10 MG/ML
20 INJECTION INTRAMUSCULAR; INTRAVENOUS
Status: DISCONTINUED | OUTPATIENT
Start: 2018-04-20 | End: 2018-04-22

## 2018-04-20 RX ORDER — ONDANSETRON 2 MG/ML
4 INJECTION INTRAMUSCULAR; INTRAVENOUS EVERY 4 HOURS PRN
Status: DISCONTINUED | OUTPATIENT
Start: 2018-04-20 | End: 2018-04-22 | Stop reason: HOSPADM

## 2018-04-20 RX ORDER — POLYETHYLENE GLYCOL 3350 17 G/17G
1 POWDER, FOR SOLUTION ORAL
Status: DISCONTINUED | OUTPATIENT
Start: 2018-04-20 | End: 2018-04-22 | Stop reason: HOSPADM

## 2018-04-20 RX ORDER — BISACODYL 10 MG
10 SUPPOSITORY, RECTAL RECTAL
Status: DISCONTINUED | OUTPATIENT
Start: 2018-04-20 | End: 2018-04-22 | Stop reason: HOSPADM

## 2018-04-20 RX ORDER — ONDANSETRON 4 MG/1
4 TABLET, ORALLY DISINTEGRATING ORAL EVERY 4 HOURS PRN
Status: DISCONTINUED | OUTPATIENT
Start: 2018-04-20 | End: 2018-04-22 | Stop reason: HOSPADM

## 2018-04-20 RX ORDER — HEPARIN SODIUM 5000 [USP'U]/ML
5000 INJECTION, SOLUTION INTRAVENOUS; SUBCUTANEOUS EVERY 8 HOURS
Status: DISCONTINUED | OUTPATIENT
Start: 2018-04-20 | End: 2018-04-22 | Stop reason: HOSPADM

## 2018-04-20 RX ADMIN — HEPARIN SODIUM 5000 UNITS: 5000 INJECTION, SOLUTION INTRAVENOUS; SUBCUTANEOUS at 22:31

## 2018-04-20 RX ADMIN — IOHEXOL 100 ML: 350 INJECTION, SOLUTION INTRAVENOUS at 19:47

## 2018-04-20 RX ADMIN — FUROSEMIDE 20 MG: 10 INJECTION, SOLUTION INTRAMUSCULAR; INTRAVENOUS at 21:38

## 2018-04-20 ASSESSMENT — ENCOUNTER SYMPTOMS
MYALGIAS: 0
BACK PAIN: 0
COUGH: 0
VOMITING: 0
FEVER: 0
EYE DISCHARGE: 0
STRIDOR: 0
SEIZURES: 0
HEARTBURN: 0
EYE PAIN: 0
NECK PAIN: 0
CHILLS: 0
SPUTUM PRODUCTION: 0
INSOMNIA: 0
PALPITATIONS: 0
DIARRHEA: 0
ABDOMINAL PAIN: 0
NAUSEA: 0
DEPRESSION: 0
EYE REDNESS: 0
HEADACHES: 0
FOCAL WEAKNESS: 0
ORTHOPNEA: 0
BLURRED VISION: 0
WEIGHT LOSS: 0
SHORTNESS OF BREATH: 1
NERVOUS/ANXIOUS: 0
DIZZINESS: 0

## 2018-04-20 ASSESSMENT — PAIN SCALES - GENERAL
PAINLEVEL_OUTOF10: 0
PAINLEVEL_OUTOF10: 0

## 2018-04-20 ASSESSMENT — PATIENT HEALTH QUESTIONNAIRE - PHQ9
1. LITTLE INTEREST OR PLEASURE IN DOING THINGS: NOT AT ALL
SUM OF ALL RESPONSES TO PHQ9 QUESTIONS 1 AND 2: 0
2. FEELING DOWN, DEPRESSED, IRRITABLE, OR HOPELESS: NOT AT ALL

## 2018-04-20 ASSESSMENT — LIFESTYLE VARIABLES
EVER_SMOKED: NEVER
ALCOHOL_USE: NO

## 2018-04-21 LAB
ANION GAP SERPL CALC-SCNC: 5 MMOL/L (ref 0–11.9)
BUN SERPL-MCNC: 22 MG/DL (ref 8–22)
CALCIUM SERPL-MCNC: 8.6 MG/DL (ref 8.4–10.2)
CHLORIDE SERPL-SCNC: 107 MMOL/L (ref 96–112)
CO2 SERPL-SCNC: 25 MMOL/L (ref 20–33)
CREAT SERPL-MCNC: 1.25 MG/DL (ref 0.5–1.4)
EKG IMPRESSION: NORMAL
EOSINOPHIL URNS QL MICRO: NORMAL
ERYTHROCYTE [DISTWIDTH] IN BLOOD BY AUTOMATED COUNT: 52.7 FL (ref 35.9–50)
GLUCOSE SERPL-MCNC: 86 MG/DL (ref 65–99)
HCT VFR BLD AUTO: 38.7 % (ref 42–52)
HGB BLD-MCNC: 12.9 G/DL (ref 14–18)
LV EJECT FRACT  99904: 55
LV EJECT FRACT MOD 2C 99903: 49.65
LV EJECT FRACT MOD 4C 99902: 46.9
LV EJECT FRACT MOD BP 99901: 48.55
MCH RBC QN AUTO: 33.4 PG (ref 27–33)
MCHC RBC AUTO-ENTMCNC: 33.3 G/DL (ref 33.7–35.3)
MCV RBC AUTO: 100.3 FL (ref 81.4–97.8)
PLATELET # BLD AUTO: 174 K/UL (ref 164–446)
PMV BLD AUTO: 11.2 FL (ref 9–12.9)
POTASSIUM SERPL-SCNC: 3.5 MMOL/L (ref 3.6–5.5)
RBC # BLD AUTO: 3.86 M/UL (ref 4.7–6.1)
SODIUM SERPL-SCNC: 137 MMOL/L (ref 135–145)
WBC # BLD AUTO: 4.8 K/UL (ref 4.8–10.8)

## 2018-04-21 PROCEDURE — 80048 BASIC METABOLIC PNL TOTAL CA: CPT

## 2018-04-21 PROCEDURE — 700111 HCHG RX REV CODE 636 W/ 250 OVERRIDE (IP): Performed by: INTERNAL MEDICINE

## 2018-04-21 PROCEDURE — 99233 SBSQ HOSP IP/OBS HIGH 50: CPT | Performed by: HOSPITALIST

## 2018-04-21 PROCEDURE — 93306 TTE W/DOPPLER COMPLETE: CPT

## 2018-04-21 PROCEDURE — 85027 COMPLETE CBC AUTOMATED: CPT

## 2018-04-21 PROCEDURE — 700102 HCHG RX REV CODE 250 W/ 637 OVERRIDE(OP): Performed by: INTERNAL MEDICINE

## 2018-04-21 PROCEDURE — 700102 HCHG RX REV CODE 250 W/ 637 OVERRIDE(OP): Performed by: HOSPITALIST

## 2018-04-21 PROCEDURE — A9270 NON-COVERED ITEM OR SERVICE: HCPCS | Performed by: HOSPITALIST

## 2018-04-21 PROCEDURE — 93306 TTE W/DOPPLER COMPLETE: CPT | Mod: 26 | Performed by: INTERNAL MEDICINE

## 2018-04-21 PROCEDURE — 770020 HCHG ROOM/CARE - TELE (206)

## 2018-04-21 PROCEDURE — A9270 NON-COVERED ITEM OR SERVICE: HCPCS | Performed by: INTERNAL MEDICINE

## 2018-04-21 RX ORDER — POTASSIUM CHLORIDE 20 MEQ/1
40 TABLET, EXTENDED RELEASE ORAL ONCE
Status: COMPLETED | OUTPATIENT
Start: 2018-04-21 | End: 2018-04-21

## 2018-04-21 RX ORDER — POTASSIUM CHLORIDE 20 MEQ/1
40 TABLET, EXTENDED RELEASE ORAL ONCE
Status: DISCONTINUED | OUTPATIENT
Start: 2018-04-21 | End: 2018-04-21

## 2018-04-21 RX ADMIN — HEPARIN SODIUM 5000 UNITS: 5000 INJECTION, SOLUTION INTRAVENOUS; SUBCUTANEOUS at 20:42

## 2018-04-21 RX ADMIN — DOCUSATE SODIUM AND SENNOSIDES 2 TABLET: 8.6; 5 TABLET, FILM COATED ORAL at 20:40

## 2018-04-21 RX ADMIN — LISINOPRIL 10 MG: 5 TABLET ORAL at 07:53

## 2018-04-21 RX ADMIN — HEPARIN SODIUM 5000 UNITS: 5000 INJECTION, SOLUTION INTRAVENOUS; SUBCUTANEOUS at 15:23

## 2018-04-21 RX ADMIN — METOPROLOL TARTRATE 25 MG: 25 TABLET ORAL at 20:40

## 2018-04-21 RX ADMIN — METOPROLOL TARTRATE 25 MG: 25 TABLET ORAL at 09:18

## 2018-04-21 RX ADMIN — HEPARIN SODIUM 5000 UNITS: 5000 INJECTION, SOLUTION INTRAVENOUS; SUBCUTANEOUS at 06:26

## 2018-04-21 RX ADMIN — POTASSIUM CHLORIDE 40 MEQ: 1500 TABLET, EXTENDED RELEASE ORAL at 09:17

## 2018-04-21 RX ADMIN — FUROSEMIDE 20 MG: 10 INJECTION, SOLUTION INTRAMUSCULAR; INTRAVENOUS at 15:23

## 2018-04-21 RX ADMIN — FUROSEMIDE 20 MG: 10 INJECTION, SOLUTION INTRAMUSCULAR; INTRAVENOUS at 06:26

## 2018-04-21 ASSESSMENT — ENCOUNTER SYMPTOMS
FEVER: 0
HEADACHES: 0
TINGLING: 0
NAUSEA: 0
BLURRED VISION: 0
DIZZINESS: 0
CHILLS: 0
COUGH: 0
BACK PAIN: 0
SORE THROAT: 0
ABDOMINAL PAIN: 0
EYE PAIN: 0
DEPRESSION: 0
SHORTNESS OF BREATH: 1
VOMITING: 0
NECK PAIN: 0
INSOMNIA: 0
PALPITATIONS: 0

## 2018-04-21 ASSESSMENT — PAIN SCALES - GENERAL
PAINLEVEL_OUTOF10: 0
PAINLEVEL_OUTOF10: 2
PAINLEVEL_OUTOF10: 0

## 2018-04-21 NOTE — PROGRESS NOTES
Telemetry Summary    Rhythm Interpretation: Sinus Rhythm  Heart Rate: 79  [place] Interval: 0.16  QRS Interval: 0.08  QT Interval: 0.42

## 2018-04-21 NOTE — PROGRESS NOTES
"Subjective:      Pt is a 67 y.o. male who presents with Shortness of Breath            HPI  Pt notes worsening SOB in the last 2-3 days. Pt states he has this issue from time to time due to a \"small heart valve deformity that was diagnosed at [facility] a year or so ago. Pt states he is very fatigued and SOB with difficulty performing rountine ADLs. Pt denies asthma or COPD or other lung disorders. Pt has not taken any Rx medications for this condition. Pt states the pain is a 5/10, aching in nature and worse at night. Pt denies CP,  NVD, paresthesias, headaches, dizziness, change in vision, hives, or other joint pain. The pt's medication list, problem list, and allergies have been evaluated and reviewed during today's visit.    PMH:  Past Medical History:   Diagnosis Date   • Cholesterol serum elevated     diet and exercise controlled   • Enlarged tonsils     removed   • Hearing disorder 5/20/2009       PSH:  Past Surgical History:   Procedure Laterality Date   • OTHER      coclear implant left ear 1/06   • OTHER ORTHOPEDIC SURGERY      traumatic amputation left middle finger       Fam Hx:  the patient's family history is not pertinent to their current complaint      Soc HX:  Social History     Social History   • Marital status: Single     Spouse name: N/A   • Number of children: N/A   • Years of education: N/A     Occupational History   • Not on file.     Social History Main Topics   • Smoking status: Never Smoker   • Smokeless tobacco: Never Used   • Alcohol use Yes      Comment: occasional   • Drug use: No   • Sexual activity: Not on file     Other Topics Concern   • Not on file     Social History Narrative   • No narrative on file         Medications:    Current Outpatient Prescriptions:   •  amitriptyline (ELAVIL) 25 MG Tab, Take 1 Tab by mouth every bedtime., Disp: 30 Tab, Rfl: 5  •  acetaminophen/caffeine/butalbital 300-40-50 mg (FIORICET) -40 MG Cap capsule, Take 1-2 Caps by mouth every 6 hours as " needed for Headache., Disp: 12 Cap, Rfl: 0      Allergies:  Patient has no known allergies.    ROS  Constitutional: Negative for fever, chills and malaise/fatigue.   HENT: Negative for congestion and sore throat.    Eyes: Negative for blurred vision, double vision and photophobia.   Respiratory: Negative for cough and POS shortness of breath.    Cardiovascular: Negative for chest pain and palpitations.   Gastrointestinal: Negative for heartburn, nausea, vomiting, abdominal pain, diarrhea and constipation.   Genitourinary: Negative for dysuria and flank pain.   Musculoskeletal: Negative for joint pain and myalgias.   Skin: Negative for itching and rash.   Neurological: Negative for dizziness, tingling and headaches.   Endo/Heme/Allergies: Does not bruise/bleed easily.   Psychiatric/Behavioral: Negative for depression. The patient is not nervous/anxious.           Objective:     /80   Pulse 95   Temp 37.4 °C (99.3 °F)   Resp 16   Ht 1.829 m (6')   Wt 83 kg (183 lb)   SpO2 95%   BMI 24.82 kg/m²      Physical Exam       Constitutional: PT is oriented to person, place, and time. PT appears well-developed and well-nourished. No distress.   HENT:   Head: Normocephalic and atraumatic.   Mouth/Throat: Oropharynx is clear and moist. No oropharyngeal exudate.   Eyes: Conjunctivae normal and EOM are normal. Pupils are equal, round, and reactive to light.   Neck: Normal range of motion. Neck supple. No thyromegaly present.   EARS: WNL. Pt wears cochlear implant on left  Cardiovascular: Normal rate, regular rhythm, normal heart sounds and intact distal pulses.  Exam reveals no gallop and no friction rub.  GABRIELLA 2/6  Pulmonary/Chest: Effort normal and breath sounds normal. No respiratory distress. PT has RLL/RML wheezes. PT has no rales. Pt exhibits no tenderness.   Abdominal: Soft. Bowel sounds are normal. PT exhibits no distension and no mass. There is no tenderness. There is no rebound and no guarding.    Musculoskeletal: Normal range of motion. PT exhibits no edema and no tenderness. Pt missing end of 4th digit on left hand  Neurological: PT is alert and oriented to person, place, and time. PT has normal reflexes. No cranial nerve deficit.   Skin: Skin is warm and dry. No rash noted. PT is not diaphoretic. No erythema.       Psychiatric: PT has a normal mood and affect. PT behavior is normal. Judgment and thought content normal.     RADS:  Narrative       4/20/2018 4:11 PM    HISTORY/REASON FOR EXAM:  Shortness of Breath      TECHNIQUE/EXAM DESCRIPTION AND NUMBER OF VIEWS:  Two views of the chest.    COMPARISON:  5/14/2009    FINDINGS:  Cardiac mediastinal contour is unchanged.  Mild blunting of RIGHT costophrenic angle.  No focal consolidation.  No pneumothorax.  No major bony abnormality is seen.   Impression       1.  Small RIGHT pleural effusion.  2.  Stable cardiomegaly.  3.  No pneumonia or pulmonary edema.   Reading Provider Reading Date   Mason Zhang M.D. Apr 20, 2018   Signing Provider Signing Date Signing Time   Mason Zhang M.D. Apr 20, 2018  4:49 PM          Assessment/Plan:     1. Pleural effusion on right      2. SOB (shortness of breath)    - EKG-->LVH, prolonged QT  - DX-CHEST-2 VIEWS; Future    TO [facility] ER NOW FOR further evaluation. Spoke with Dr. Ga on the phone, attending at the ER , and he graciously accepted transfer of care for further imaging and evaluation of the pt.  Pt to go POV to ER now  Rest, fluids encouraged.  AVS with medical info given.  Pt was in full understanding and agreement with the plan.  Follow-up as needed if symptoms worsen or fail to improve.

## 2018-04-21 NOTE — ED PROVIDER NOTES
ED Provider Note  CHIEF COMPLAINT  Chief Complaint   Patient presents with   • Shortness of Breath       HPI  Honorio Kimbrough is a 67 y.o. male who presents for evaluation shortness of breath.. The patient presented to urgent care today with progressive shortness of breath and was found to have a small pleural effusion to his right chest. He was sent to the emergency department for further evaluation. He denies any fever chills chest pain. He's been having progressive shortness of breath over the past 4 months. Denies any history of leg swelling, exertional chest pain, no history of heart failure . exertion makes his symptoms worse. He has a history of a cochlear implant. The patient does not smoke denies use of drugs of abuse    REVIEW OF SYSTEMS  See HPI for further details. All other systems reviewed and negative except as noted above    PAST MEDICAL HISTORY  Past Medical History:   Diagnosis Date   • Cholesterol serum elevated     diet and exercise controlled   • Enlarged tonsils     removed   • Hearing disorder 5/20/2009       FAMILY HISTORY  History reviewed. No pertinent family history.    SOCIAL HISTORY  Social History     Social History   • Marital status: Single     Spouse name: N/A   • Number of children: N/A   • Years of education: N/A     Social History Main Topics   • Smoking status: Never Smoker   • Smokeless tobacco: Never Used   • Alcohol use Yes      Comment: occasional   • Drug use: No   • Sexual activity: Not on file     Other Topics Concern   • Not on file     Social History Narrative   • No narrative on file       SURGICAL HISTORY  Past Surgical History:   Procedure Laterality Date   • OTHER      coclear implant left ear 1/06   • OTHER ORTHOPEDIC SURGERY      traumatic amputation left middle finger       CURRENT MEDICATIONS  Home Medications    **Home medications have not yet been reviewed for this encounter**          ALLERGIES  No Known Allergies    PHYSICAL EXAM  VITAL SIGNS: Pulse 90   Resp  20   Ht 1.829 m (6')   Wt 83 kg (183 lb)   SpO2 94%   BMI 24.82 kg/m²   Constitutional :  Well developed, Well nourished, No acute distress, Non-toxic appearance.   HENT: Head is atraumatic normocephalic slightly dry mucous membranes  Eyes: Normal-appearing nonicteric  Neck: Normal range of motion, No tenderness, Supple, No stridor.   Lymphatic: No cervical adenopathy is noted.   Cardiovascular: Normal heart rate, Normal rhythm, No murmurs, No rubs, No gallops.   Thorax & Lungs: Equal breath sounds are noted bilaterally without rales rhonchi  Skin: Warm, Dry, No erythema, No rash.   Abdomen is soft nontender no distention  Extremities is no cyanosis or edema  Neurological the patient awake alert without focal findings  Psychiatric appropriate mood and affect    CT-CTA CHEST PULMONARY ARTERY W/ RECONS   Final Result      1.  Respiratory motion degraded study. Only large central emboli are excluded with this examination.      2.  Bibasilar atelectasis and small bilateral pleural effusions.      3.  Cardiomegaly which involves the left atrium and ventricle to the greatest degree.      4.  Aneurysmal dilatation of the ascending aorta which is measured at 5 x 4.9 cm in diameter.      5.  Fatty liver.        Results for orders placed or performed during the hospital encounter of 04/20/18   CBC w/ Differential   Result Value Ref Range    WBC 4.6 (L) 4.8 - 10.8 K/uL    RBC 3.66 (L) 4.70 - 6.10 M/uL    Hemoglobin 12.6 (L) 14.0 - 18.0 g/dL    Hematocrit 37.1 (L) 42.0 - 52.0 %    .4 (H) 81.4 - 97.8 fL    MCH 34.4 (H) 27.0 - 33.0 pg    MCHC 34.0 33.7 - 35.3 g/dL    RDW 53.1 (H) 35.9 - 50.0 fL    Platelet Count 165 164 - 446 K/uL    MPV 11.0 9.0 - 12.9 fL    Neutrophils-Polys 62.30 44.00 - 72.00 %    Lymphocytes 25.10 22.00 - 41.00 %    Monocytes 9.10 0.00 - 13.40 %    Eosinophils 2.40 0.00 - 6.90 %    Basophils 0.90 0.00 - 1.80 %    Immature Granulocytes 0.20 0.00 - 0.90 %    Nucleated RBC 0.00 /100 WBC     Neutrophils (Absolute) 2.89 1.82 - 7.42 K/uL    Lymphs (Absolute) 1.16 1.00 - 4.80 K/uL    Monos (Absolute) 0.42 0.00 - 0.85 K/uL    Eos (Absolute) 0.11 0.00 - 0.51 K/uL    Baso (Absolute) 0.04 0.00 - 0.12 K/uL    Immature Granulocytes (abs) 0.01 0.00 - 0.11 K/uL    NRBC (Absolute) 0.00 K/uL   Complete Metabolic Panel (CMP)   Result Value Ref Range    Sodium 135 135 - 145 mmol/L    Potassium 3.8 3.6 - 5.5 mmol/L    Chloride 108 96 - 112 mmol/L    Co2 24 20 - 33 mmol/L    Anion Gap 3.0 0.0 - 11.9    Glucose 107 (H) 65 - 99 mg/dL    Bun 26 (H) 8 - 22 mg/dL    Creatinine 1.34 0.50 - 1.40 mg/dL    Calcium 8.5 8.4 - 10.2 mg/dL    AST(SGOT) 30 12 - 45 U/L    ALT(SGPT) 42 2 - 50 U/L    Alkaline Phosphatase 62 30 - 99 U/L    Total Bilirubin 0.8 0.1 - 1.5 mg/dL    Albumin 3.7 3.2 - 4.9 g/dL    Total Protein 6.4 6.0 - 8.2 g/dL    Globulin 2.7 1.9 - 3.5 g/dL    A-G Ratio 1.4 g/dL   Btype Natriuretic Peptide   Result Value Ref Range    B Natriuretic Peptide 1264 (H) 0 - 100 pg/mL   Troponin STAT   Result Value Ref Range    Troponin I 0.04 0.00 - 0.04 ng/mL   TSH (for screening thyroid dysfunction)   Result Value Ref Range    TSH 3.300 0.380 - 5.330 uIU/mL   ESTIMATED GFR   Result Value Ref Range    GFR If African American >60 >60 mL/min/1.73 m 2    GFR If Non  53 (A) >60 mL/min/1.73 m 2     COURSE & MEDICAL DECISION MAKING  Pertinent Labs & Imaging studies reviewed. (See chart for details)  The patient presenting with shortness of breath increase in severity. He does have some mild pleural effusions of unclear etiology CT imaging was performed to rule out potential pulmonary embolus this is a negative study is incidentally noted he has a ascending aortic aneurysm. He has elevation of his BNP probably consistent with mild heart failure. I think the patient needs to be admitted for treatment of mild heart failure, cardiac echo rule out possible myopathy. I discussed case with the hospitalist who will be admitting  the patient. He has no history consistent with acute ischemia of the heart    FINAL IMPRESSION  1. Acute dyspnea  2. Ascending aortic aneurysm  3.      Electronically signed by: Rico Castrejon, 4/20/2018

## 2018-04-21 NOTE — PATIENT INSTRUCTIONS
Pleural Effusion  A pleural effusion is an abnormal buildup of fluid in the layers of tissue between your lungs and the inside of your chest (pleural space). These two layers of tissue that line both your lungs and the inside of your chest are called pleura. Usually, there is no air in the space between the pleura, only a thin layer of fluid. If left untreated, a large amount of fluid can build up and cause the lung to collapse. A pleural effusion is usually caused by another disease that requires treatment.  The two main types of pleural effusion are:  · Transudative pleural effusion. This happens when fluid leaks into the pleural space because of a low protein count in your blood or high blood pressure in your vessels. Heart failure often causes this.  · Exudative infusion. This occurs when fluid collects in the pleural space from blocked blood vessels or lymph vessels. Some lung diseases, injuries, and cancers can cause this type of effusion.  What are the causes?  Pleural effusion can be caused by:  · Heart failure.  · A blood clot in the lung (pulmonary embolism).  · Pneumonia.  · Cancer.  · Liver failure (cirrhosis).  · Kidney disease.  · Complications from surgery, such as from open heart surgery.  What are the signs or symptoms?  In some cases, pleural effusion may cause no symptoms. Symptoms can include:  · Shortness of breath, especially when lying down.  · Chest pain, often worse when taking a deep breath.  · Fever.  · Dry cough that is lasting (chronic).  · Hiccups.  · Rapid breathing.  An underlying condition that is causing the pleural effusion (such as heart failure, pneumonia, blood clots, tuberculosis, or cancer) may also cause additional symptoms.  How is this diagnosed?  Your health care provider may suspect pleural effusion based on your symptoms and medical history. Your health care provider will also do a physical exam and a chest X-ray. If the X-ray shows there is fluid in your chest, you may  need to have this fluid removed using a needle (thoracentesis) so it can be tested.  You may also have:  · Imaging studies of the chest, such as:  ¨ Ultrasound.  ¨ CT scan.  · Blood tests for kidney and liver function.  How is this treated?  Treatment depends on the cause of the pleural effusion. Treatment may include:  · Taking antibiotic medicines to clear up an infection that is causing the pleural effusion.  · Placing a tube in the chest to drain the effusion (tube thoracostomy). This procedure is often used when there is an infection in the fluid.  · Surgery to remove the fibrous outer layer of tissue from the pleural space (decortication).  · Thoracentesis, which can improve cough and shortness of breath.  · A procedure to put medicine into the chest cavity to seal the pleural space to prevent fluid buildup (pleurodesis).  · Chemotherapy and radiation therapy. These may be required in the case of cancerous (malignant) pleural effusion.  Follow these instructions at home:  · Take medicines only as directed by your health care provider.  · Keep track of how long you can gently exercise before you get short of breath. Try simply walking at first.  · Do not use any tobacco products, including cigarettes, chewing tobacco, or electronic cigarettes. If you need help quitting, ask your health care provider.  · Keep all follow-up visits as directed by your health care provider. This is important.  Contact a health care provider if:  · The amount of time that you are able to exercise decreases or does not improve with time.  · You have pain or signs of infection at the puncture site if you had thoracentesis. Watch for:  ¨ Drainage.  ¨ Redness.  ¨ Swelling.  · You have a fever.  Get help right away if:  · You are short of breath.  · You develop chest pain.  · You develop a new cough.  This information is not intended to replace advice given to you by your health care provider. Make sure you discuss any questions you have  with your health care provider.  Document Released: 12/18/2006 Document Revised: 05/22/2017 Document Reviewed: 05/13/2015  Elsevier Interactive Patient Education © 2017 [employer]

## 2018-04-21 NOTE — DISCHARGE PLANNING
Medical Social Work    Referral: IMM letter     Intervention: Pt signed for first IMM letter    Plan: Wait for attendings recommendation

## 2018-04-21 NOTE — CARE PLAN
Problem: Venous Thromboembolism (VTW)/Deep Vein Thrombosis (DVT) Prevention:  Goal: Patient will participate in Venous Thrombosis (VTE)/Deep Vein Thrombosis (DVT)Prevention Measures   04/21/18 0800   OTHER   Risk Assessment Score 3   VTE RISK High   Pharmacologic Prophylaxis Used Unfractionated Heparin       Problem: Knowledge Deficit  Goal: Knowledge of disease process/condition, treatment plan, diagnostic tests, and medications will improve  The plan of care for today discussed with patient (rest, medication administration, heart failure education)

## 2018-04-21 NOTE — PROGRESS NOTES
The Medication Reconciliation process has been completed by interviewing the patient    Allergies have been reviewed  Antibiotic use in 30 days - none    [facility]

## 2018-04-21 NOTE — PROGRESS NOTES
Admit to 310-1 from ED. Oriented to room and equipment. Assessment completed. Due medications given. No c/o pain or SOB at this time. Remains on room air, O2 sat 95%. Plan of care discussed. States understanding. Call light in reach.

## 2018-04-21 NOTE — PROGRESS NOTES
Resting on and off. Respirations even and unlabored. No c/o. Remains on room air O2 sat 94%. Call light in reach.     Tele Report:   Sinus Rhythm  HR 70-81  Rare PVC  0.16/ 0.08/ 0.42

## 2018-04-21 NOTE — PROGRESS NOTES
Resting with eyes closed. Easily aroused to touch. C/o bilateral leg cramping relieved with movement, states this is common for him. Declined interventions. Due medications given without issue. Urinal in reach. Snack and coffee provided. Call light in reach.

## 2018-04-21 NOTE — PROGRESS NOTES
[facility] Hospitalist Progress Note    Date of Service: 2018    Chief Complaint  67 y.o. male admitted 2018 with dyspnea ofund to have possible CHF flare.     Interval Problem Update  Less sob  Says he has had a heart valve issues since he was a kid.     Consultants/Specialty  none    Disposition  hospital        Review of Systems   Constitutional: Positive for malaise/fatigue. Negative for chills and fever.   HENT: Negative for sore throat.    Eyes: Negative for blurred vision and pain.   Respiratory: Positive for shortness of breath. Negative for cough.    Cardiovascular: Negative for chest pain and palpitations.   Gastrointestinal: Negative for abdominal pain, nausea and vomiting.   Genitourinary: Negative for dysuria and urgency.   Musculoskeletal: Negative for back pain and neck pain.   Skin: Negative for itching and rash.   Neurological: Negative for dizziness, tingling and headaches.   Psychiatric/Behavioral: Negative for depression. The patient does not have insomnia.    All other systems reviewed and are negative.     Physical Exam  Laboratory/Imaging   Hemodynamics  Temp (24hrs), Av.6 °C (97.8 °F), Min:36.5 °C (97.7 °F), Max:36.7 °C (98 °F)   Temperature: 36.6 °C (97.8 °F)  Pulse  Av.9  Min: 74  Max: 90 Heart Rate (Monitored): 84  Blood Pressure : 124/78, NIBP: 143/81      Respiratory      Respiration: 16, Pulse Oximetry: 94 %        RUL Breath Sounds: Clear, RML Breath Sounds: Clear, RLL Breath Sounds: Diminished, TAY Breath Sounds: Clear, LLL Breath Sounds: Diminished    Fluids    Intake/Output Summary (Last 24 hours) at 18 0858  Last data filed at 18 0600   Gross per 24 hour   Intake              540 ml   Output              650 ml   Net             -110 ml       Nutrition  Orders Placed This Encounter   Procedures   • Diet Order     Standing Status:   Standing     Number of Occurrences:   1     Order Specific Question:   Diet:     Answer:   Regular [1]     Physical Exam    Constitutional: He is oriented to person, place, and time. He appears well-developed and well-nourished. No distress.   HENT:   Right Ear: External ear normal.   Left Ear: External ear normal.   Nose: Nose normal.   Eyes: Right eye exhibits no discharge. Left eye exhibits no discharge. No scleral icterus.   Neck: No JVD present. No tracheal deviation present.   Cardiovascular: Normal rate and intact distal pulses.    Murmur heard.  s3   Pulmonary/Chest: Effort normal and breath sounds normal. No respiratory distress. He has no wheezes. He has no rales.   Abdominal: Soft. Bowel sounds are normal. He exhibits no distension. There is no tenderness. There is no guarding.   Musculoskeletal: He exhibits no edema or tenderness.   Neurological: He is alert and oriented to person, place, and time.   Skin: Skin is warm and dry. He is not diaphoretic. No erythema.   Normal skin color   Psychiatric: He has a normal mood and affect. His behavior is normal.   Nursing note and vitals reviewed.      Recent Labs      04/20/18 1818 04/21/18   0436   WBC  4.6*  4.8   RBC  3.66*  3.86*   HEMOGLOBIN  12.6*  12.9*   HEMATOCRIT  37.1*  38.7*   MCV  101.4*  100.3*   MCH  34.4*  33.4*   MCHC  34.0  33.3*   RDW  53.1*  52.7*   PLATELETCT  165  174   MPV  11.0  11.2     Recent Labs      04/20/18   1818  04/21/18   0436   SODIUM  135  137   POTASSIUM  3.8  3.5*   CHLORIDE  108  107   CO2  24  25   GLUCOSE  107*  86   BUN  26*  22   CREATININE  1.34  1.25   CALCIUM  8.5  8.6         Recent Labs      04/20/18 1818   BNPBTYPENAT  1264*              Assessment/Plan     SOB (shortness of breath)- (present on admission)   Assessment & Plan    With elevated bNP and LE edema  Possible chf vs valvular heart disease  Pending echo  Continue iv lasix 20 mg bid            Aneurysm of ascending aorta (CMS-HCC)- (present on admission)   Assessment & Plan    5x4.9 cm aneurysm  Will need vascular surgery and imaging follow up in next 3 months  Dc  lisinopril and use BB to control pulse pressure          HEATHER (acute kidney injury) (CMS-HCC)- (present on admission)   Assessment & Plan    Suspect chf related  On lasix  Stop lisinopril- contraindicated here.   Follow cmp          Macrocytic anemia- (present on admission)   Assessment & Plan    Pending b12, folate, tsh  Follow cbc          Quality-Core Measures   Reviewed items::  EKG reviewed, Medications reviewed, Radiology images reviewed and Labs reviewed  Gutierrez catheter::  No Gutierrez  DVT prophylaxis pharmacological::  Heparin  DVT prophylaxis - mechanical:  SCDs  Assessed for rehabilitation services:  Patient was assess for and/or received rehabilitation services during this hospitalization

## 2018-04-22 ENCOUNTER — HOSPITAL ENCOUNTER (INPATIENT)
Facility: MEDICAL CENTER | Age: 68
LOS: 4 days | DRG: 287 | End: 2018-04-26
Attending: HOSPITALIST | Admitting: HOSPITALIST
Payer: MEDICARE

## 2018-04-22 VITALS
TEMPERATURE: 97.6 F | RESPIRATION RATE: 18 BRPM | SYSTOLIC BLOOD PRESSURE: 112 MMHG | HEART RATE: 69 BPM | BODY MASS INDEX: 24.87 KG/M2 | WEIGHT: 183.64 LBS | HEIGHT: 72 IN | OXYGEN SATURATION: 95 % | DIASTOLIC BLOOD PRESSURE: 73 MMHG

## 2018-04-22 PROBLEM — N17.9 ARF (ACUTE RENAL FAILURE) (HCC): Status: ACTIVE | Noted: 2018-04-22

## 2018-04-22 PROBLEM — I38 VALVULAR HEART DISEASE: Status: ACTIVE | Noted: 2018-04-22

## 2018-04-22 LAB
ALBUMIN SERPL BCP-MCNC: 3.4 G/DL (ref 3.2–4.9)
ALBUMIN/GLOB SERPL: 1.2 G/DL
ALP SERPL-CCNC: 61 U/L (ref 30–99)
ALT SERPL-CCNC: 34 U/L (ref 2–50)
ANION GAP SERPL CALC-SCNC: 7 MMOL/L (ref 0–11.9)
AST SERPL-CCNC: 20 U/L (ref 12–45)
BASOPHILS # BLD AUTO: 1.2 % (ref 0–1.8)
BASOPHILS # BLD: 0.07 K/UL (ref 0–0.12)
BILIRUB SERPL-MCNC: 0.9 MG/DL (ref 0.1–1.5)
BUN SERPL-MCNC: 24 MG/DL (ref 8–22)
CALCIUM SERPL-MCNC: 8.9 MG/DL (ref 8.4–10.2)
CHLORIDE SERPL-SCNC: 103 MMOL/L (ref 96–112)
CO2 SERPL-SCNC: 26 MMOL/L (ref 20–33)
CREAT SERPL-MCNC: 1.47 MG/DL (ref 0.5–1.4)
EOSINOPHIL # BLD AUTO: 0.2 K/UL (ref 0–0.51)
EOSINOPHIL NFR BLD: 3.5 % (ref 0–6.9)
ERYTHROCYTE [DISTWIDTH] IN BLOOD BY AUTOMATED COUNT: 52.7 FL (ref 35.9–50)
GLOBULIN SER CALC-MCNC: 2.8 G/DL (ref 1.9–3.5)
GLUCOSE SERPL-MCNC: 100 MG/DL (ref 65–99)
HCT VFR BLD AUTO: 41.8 % (ref 42–52)
HGB BLD-MCNC: 13.8 G/DL (ref 14–18)
IMM GRANULOCYTES # BLD AUTO: 0.01 K/UL (ref 0–0.11)
IMM GRANULOCYTES NFR BLD AUTO: 0.2 % (ref 0–0.9)
LYMPHOCYTES # BLD AUTO: 1.53 K/UL (ref 1–4.8)
LYMPHOCYTES NFR BLD: 26.7 % (ref 22–41)
MAGNESIUM SERPL-MCNC: 2.2 MG/DL (ref 1.5–2.5)
MCH RBC QN AUTO: 33.2 PG (ref 27–33)
MCHC RBC AUTO-ENTMCNC: 33 G/DL (ref 33.7–35.3)
MCV RBC AUTO: 100.5 FL (ref 81.4–97.8)
MONOCYTES # BLD AUTO: 0.56 K/UL (ref 0–0.85)
MONOCYTES NFR BLD AUTO: 9.8 % (ref 0–13.4)
NEUTROPHILS # BLD AUTO: 3.36 K/UL (ref 1.82–7.42)
NEUTROPHILS NFR BLD: 58.6 % (ref 44–72)
NRBC # BLD AUTO: 0 K/UL
NRBC BLD-RTO: 0 /100 WBC
PLATELET # BLD AUTO: 197 K/UL (ref 164–446)
PMV BLD AUTO: 11.2 FL (ref 9–12.9)
POTASSIUM SERPL-SCNC: 4.3 MMOL/L (ref 3.6–5.5)
PROT SERPL-MCNC: 6.2 G/DL (ref 6–8.2)
RBC # BLD AUTO: 4.16 M/UL (ref 4.7–6.1)
SODIUM SERPL-SCNC: 136 MMOL/L (ref 135–145)
WBC # BLD AUTO: 5.7 K/UL (ref 4.8–10.8)

## 2018-04-22 PROCEDURE — A9270 NON-COVERED ITEM OR SERVICE: HCPCS | Performed by: HOSPITALIST

## 2018-04-22 PROCEDURE — 700111 HCHG RX REV CODE 636 W/ 250 OVERRIDE (IP): Performed by: HOSPITALIST

## 2018-04-22 PROCEDURE — 700102 HCHG RX REV CODE 250 W/ 637 OVERRIDE(OP): Performed by: HOSPITALIST

## 2018-04-22 PROCEDURE — 90471 IMMUNIZATION ADMIN: CPT

## 2018-04-22 PROCEDURE — 700111 HCHG RX REV CODE 636 W/ 250 OVERRIDE (IP): Performed by: INTERNAL MEDICINE

## 2018-04-22 PROCEDURE — 99223 1ST HOSP IP/OBS HIGH 75: CPT | Performed by: HOSPITALIST

## 2018-04-22 PROCEDURE — A9270 NON-COVERED ITEM OR SERVICE: HCPCS | Performed by: INTERNAL MEDICINE

## 2018-04-22 PROCEDURE — 3E0234Z INTRODUCTION OF SERUM, TOXOID AND VACCINE INTO MUSCLE, PERCUTANEOUS APPROACH: ICD-10-PCS | Performed by: HOSPITALIST

## 2018-04-22 PROCEDURE — 85025 COMPLETE CBC W/AUTO DIFF WBC: CPT

## 2018-04-22 PROCEDURE — 80053 COMPREHEN METABOLIC PANEL: CPT

## 2018-04-22 PROCEDURE — 83735 ASSAY OF MAGNESIUM: CPT

## 2018-04-22 PROCEDURE — 700102 HCHG RX REV CODE 250 W/ 637 OVERRIDE(OP): Performed by: INTERNAL MEDICINE

## 2018-04-22 PROCEDURE — 770020 HCHG ROOM/CARE - TELE (206)

## 2018-04-22 PROCEDURE — 90670 PCV13 VACCINE IM: CPT | Performed by: HOSPITALIST

## 2018-04-22 RX ORDER — BISACODYL 10 MG
10 SUPPOSITORY, RECTAL RECTAL
Status: DISCONTINUED | OUTPATIENT
Start: 2018-04-22 | End: 2018-04-26 | Stop reason: HOSPADM

## 2018-04-22 RX ORDER — HYDRALAZINE HYDROCHLORIDE 50 MG/1
50 TABLET, FILM COATED ORAL EVERY 8 HOURS
Status: DISCONTINUED | OUTPATIENT
Start: 2018-04-22 | End: 2018-04-23

## 2018-04-22 RX ORDER — AMOXICILLIN 250 MG
2 CAPSULE ORAL 2 TIMES DAILY
Status: DISCONTINUED | OUTPATIENT
Start: 2018-04-22 | End: 2018-04-26 | Stop reason: HOSPADM

## 2018-04-22 RX ORDER — ONDANSETRON 2 MG/ML
4 INJECTION INTRAMUSCULAR; INTRAVENOUS EVERY 4 HOURS PRN
Status: CANCELLED | OUTPATIENT
Start: 2018-04-22

## 2018-04-22 RX ORDER — POLYETHYLENE GLYCOL 3350 17 G/17G
1 POWDER, FOR SOLUTION ORAL
Status: CANCELLED | OUTPATIENT
Start: 2018-04-22

## 2018-04-22 RX ORDER — MIDAZOLAM HYDROCHLORIDE 1 MG/ML
INJECTION INTRAMUSCULAR; INTRAVENOUS
Status: COMPLETED
Start: 2018-04-22 | End: 2018-04-22

## 2018-04-22 RX ORDER — HEPARIN SODIUM 5000 [USP'U]/ML
5000 INJECTION, SOLUTION INTRAVENOUS; SUBCUTANEOUS EVERY 8 HOURS
Status: CANCELLED | OUTPATIENT
Start: 2018-04-22

## 2018-04-22 RX ORDER — ATORVASTATIN CALCIUM 40 MG/1
40 TABLET, FILM COATED ORAL
Status: DISCONTINUED | OUTPATIENT
Start: 2018-04-22 | End: 2018-04-26 | Stop reason: HOSPADM

## 2018-04-22 RX ORDER — POLYETHYLENE GLYCOL 3350 17 G/17G
1 POWDER, FOR SOLUTION ORAL
Status: DISCONTINUED | OUTPATIENT
Start: 2018-04-22 | End: 2018-04-26 | Stop reason: HOSPADM

## 2018-04-22 RX ORDER — ONDANSETRON 2 MG/ML
4 INJECTION INTRAMUSCULAR; INTRAVENOUS EVERY 4 HOURS PRN
Status: DISCONTINUED | OUTPATIENT
Start: 2018-04-22 | End: 2018-04-25

## 2018-04-22 RX ORDER — AMOXICILLIN 250 MG
2 CAPSULE ORAL 2 TIMES DAILY
Status: CANCELLED | OUTPATIENT
Start: 2018-04-22

## 2018-04-22 RX ORDER — ONDANSETRON 4 MG/1
4 TABLET, ORALLY DISINTEGRATING ORAL EVERY 4 HOURS PRN
Status: DISCONTINUED | OUTPATIENT
Start: 2018-04-22 | End: 2018-04-26 | Stop reason: HOSPADM

## 2018-04-22 RX ORDER — ONDANSETRON 4 MG/1
4 TABLET, ORALLY DISINTEGRATING ORAL EVERY 4 HOURS PRN
Status: CANCELLED | OUTPATIENT
Start: 2018-04-22

## 2018-04-22 RX ORDER — HEPARIN SODIUM 5000 [USP'U]/ML
5000 INJECTION, SOLUTION INTRAVENOUS; SUBCUTANEOUS EVERY 8 HOURS
Status: DISCONTINUED | OUTPATIENT
Start: 2018-04-22 | End: 2018-04-26 | Stop reason: HOSPADM

## 2018-04-22 RX ORDER — BISACODYL 10 MG
10 SUPPOSITORY, RECTAL RECTAL
Status: CANCELLED | OUTPATIENT
Start: 2018-04-22

## 2018-04-22 RX ADMIN — ATORVASTATIN CALCIUM 40 MG: 40 TABLET, FILM COATED ORAL at 21:46

## 2018-04-22 RX ADMIN — HEPARIN SODIUM 5000 UNITS: 5000 INJECTION, SOLUTION INTRAVENOUS; SUBCUTANEOUS at 13:39

## 2018-04-22 RX ADMIN — HEPARIN SODIUM 5000 UNITS: 5000 INJECTION, SOLUTION INTRAVENOUS; SUBCUTANEOUS at 05:35

## 2018-04-22 RX ADMIN — METOPROLOL TARTRATE 25 MG: 25 TABLET, FILM COATED ORAL at 21:46

## 2018-04-22 RX ADMIN — HEPARIN SODIUM 5000 UNITS: 5000 INJECTION, SOLUTION INTRAVENOUS; SUBCUTANEOUS at 21:46

## 2018-04-22 RX ADMIN — HYDRALAZINE HYDROCHLORIDE 50 MG: 50 TABLET, FILM COATED ORAL at 13:39

## 2018-04-22 RX ADMIN — PNEUMOCOCCAL 13-VALENT CONJUGATE VACCINE 0.5 ML: 2.2; 2.2; 2.2; 2.2; 2.2; 4.4; 2.2; 2.2; 2.2; 2.2; 2.2; 2.2; 2.2 INJECTION, SUSPENSION INTRAMUSCULAR at 13:55

## 2018-04-22 RX ADMIN — FUROSEMIDE 20 MG: 10 INJECTION, SOLUTION INTRAMUSCULAR; INTRAVENOUS at 05:35

## 2018-04-22 RX ADMIN — HYDRALAZINE HYDROCHLORIDE 50 MG: 50 TABLET, FILM COATED ORAL at 21:45

## 2018-04-22 RX ADMIN — METOPROLOL TARTRATE 25 MG: 25 TABLET ORAL at 10:09

## 2018-04-22 ASSESSMENT — COGNITIVE AND FUNCTIONAL STATUS - GENERAL
SUGGESTED CMS G CODE MODIFIER DAILY ACTIVITY: CH
SUGGESTED CMS G CODE MODIFIER MOBILITY: CH
DAILY ACTIVITIY SCORE: 24
MOBILITY SCORE: 24

## 2018-04-22 ASSESSMENT — ENCOUNTER SYMPTOMS
SORE THROAT: 0
ABDOMINAL PAIN: 0
DEPRESSION: 0
NECK PAIN: 0
HEADACHES: 0
COUGH: 0
TINGLING: 0
NAUSEA: 0
PALPITATIONS: 0
INSOMNIA: 0
FEVER: 0
SHORTNESS OF BREATH: 1
CHILLS: 0
BACK PAIN: 0
EYE PAIN: 0
VOMITING: 0
BLURRED VISION: 0
DIZZINESS: 0

## 2018-04-22 ASSESSMENT — COPD QUESTIONNAIRES
COPD SCREENING SCORE: 2
HAVE YOU SMOKED AT LEAST 100 CIGARETTES IN YOUR ENTIRE LIFE: NO/DON'T KNOW
DO YOU EVER COUGH UP ANY MUCUS OR PHLEGM?: NO/ONLY WITH OCCASIONAL COLDS OR INFECTIONS
DURING THE PAST 4 WEEKS HOW MUCH DID YOU FEEL SHORT OF BREATH: NONE/LITTLE OF THE TIME

## 2018-04-22 ASSESSMENT — LIFESTYLE VARIABLES
AVERAGE NUMBER OF DAYS PER WEEK YOU HAVE A DRINK CONTAINING ALCOHOL: 3
EVER HAD A DRINK FIRST THING IN THE MORNING TO STEADY YOUR NERVES TO GET RID OF A HANGOVER: NO
HAVE PEOPLE ANNOYED YOU BY CRITICIZING YOUR DRINKING: NO
DO YOU DRINK ALCOHOL: YES
ON A TYPICAL DAY WHEN YOU DRINK ALCOHOL HOW MANY DRINKS DO YOU HAVE: 1
HAVE YOU EVER FELT YOU SHOULD CUT DOWN ON YOUR DRINKING: NO
HOW MANY TIMES IN THE PAST YEAR HAVE YOU HAD 5 OR MORE DRINKS IN A DAY: 0
TOTAL SCORE: 0
EVER_SMOKED: NEVER
CONSUMPTION TOTAL: NEGATIVE
EVER FELT BAD OR GUILTY ABOUT YOUR DRINKING: NO
TOTAL SCORE: 0
TOTAL SCORE: 0

## 2018-04-22 ASSESSMENT — PATIENT HEALTH QUESTIONNAIRE - PHQ9
2. FEELING DOWN, DEPRESSED, IRRITABLE, OR HOPELESS: NOT AT ALL
1. LITTLE INTEREST OR PLEASURE IN DOING THINGS: NOT AT ALL
SUM OF ALL RESPONSES TO PHQ9 QUESTIONS 1 AND 2: 0

## 2018-04-22 ASSESSMENT — PAIN SCALES - GENERAL
PAINLEVEL_OUTOF10: 0
PAINLEVEL_OUTOF10: 0

## 2018-04-22 NOTE — PROGRESS NOTES
Direct admit from [facility], Dr. Jerardo Ford for valvular heart disease/ascending aortic aneursym.  Discharge and readmit orders signed and held, need to be released upon pt arrival.  Pt coming by ground.

## 2018-04-22 NOTE — CARE PLAN
Problem: Safety  Goal: Will remain free from falls  Outcome: PROGRESSING AS EXPECTED  Pt has call light in reach and uses appropriately, fall precautions are in place    Problem: Knowledge Deficit  Goal: Knowledge of disease process/condition, treatment plan, diagnostic tests, and medications will improve  Outcome: PROGRESSING AS EXPECTED  Updated POC discussed at the bedside, new medication education given, all pt questions answered at the bedside

## 2018-04-22 NOTE — PROGRESS NOTES
Report given to Angela day-shift RN. Pt POC discussed, pt resting comfortably in bed, all safety precautions in place.

## 2018-04-22 NOTE — DISCHARGE PLANNING
Care Transition Team Assessment    SW intern met with patient at bedside. Pt plans to discharge with friends when medically able. Pt reports he stays with friends. Pt is planning a long term trip to [place] where he will be staying with his mother and sister soon after his discharge. Pt has no questions or concerns at this time.     Information Source  Orientation : Oriented x 4  Information Given By: Patient  Informant's Name: FABIOLA   Who is responsible for making decisions for patient? : Patient    Readmission Evaluation  Is this a readmission?: No    Elopement Risk  Legal Hold: No  Ambulatory or Self Mobile in Wheelchair: Yes  Disoriented: No  Psychiatric Symptoms: None  History of Wandering: No  Elopement Risk: Not at Risk for Elopement    Interdisciplinary Discharge Planning  Does Admitting Nurse Feel This Could be a Complex Discharge?: No  Primary Care Physician: Dr. Rios   Lives with - Patient's Self Care Capacity: Friends  Patient or legal guardian wants to designate a caregiver (see row info): No  Support Systems: Friends / Neighbors  Housing / Facility: [address]  Do You Take your Prescribed Medications Regularly: No  Able to Return to Previous ADL's: Yes  Mobility Issues: No  Prior Services: None  Patient Expects to be Discharged to:: Home  Assistance Needed: Unknown at this Time  Durable Medical Equipment: Not Applicable    Discharge Preparedness  What is your plan after discharge?: Home with help  What are your discharge supports?: Other (comment) (lives at [facility] friends houses )  Prior Functional Level: Independent with Activities of Daily Living  Difficulity with ADLs: None  Difficulity with IADLs: None    Functional Assesment  Prior Functional Level: Independent with Activities of Daily Living    Finances  Financial Barriers to Discharge: No    Vision / Hearing Impairment  Vision Impairment : Yes  Right Eye Vision: Impaired, Wears Glasses  Left Eye Vision: Impaired, Wears  Glasses  Hearing Impairment : Yes  Hearing Impairment: Both Ears, Hearing Device(s) Available    Values / Beliefs / Concerns  Values / Beliefs Concerns : No    Advance Directive  Advance Directive?: None  Advance Directive offered?: AD Booklet refused    Domestic Abuse  Have you ever been the victim of abuse or violence?: No  Physical Abuse or Sexual Abuse: No  Verbal Abuse or Emotional Abuse: No  Possible Abuse Reported to:: Not Applicable    Psychological Assessment  History of Substance Abuse: None  History of Psychiatric Problems: No  Non-compliant with Treatment: No  Newly Diagnosed Illness: No    Discharge Risks or Barriers  Discharge risks or barriers?: No    Anticipated Discharge Information  Anticipated discharge disposition: Home  Discharge Address: [address] [address]  Discharge Contact Phone Number: [phone]    This note has been reviewed by Leandra CAMARA

## 2018-04-22 NOTE — PROGRESS NOTES
Report received from Alex day-shift RN. Pt POC discussed, pt resting comfortably in bed, all safety precautions in place.

## 2018-04-22 NOTE — DISCHARGE PLANNING
Received transport form from MYA Mobley to [facility]. Faxed transport forms to [facility]. Contacted [facility] spoke to [facility] cost is $927 for Approved services. Transport time is 1100. Notified MYA Mobley via phone.

## 2018-04-22 NOTE — PROGRESS NOTES
2RN skin check done with Eugenia JACQUES.    Left ear red/blanching (cochlear implant)  Elbows red/blanching  Heels red/blanching.  Otherwise skin intact.

## 2018-04-22 NOTE — PROGRESS NOTES
Tele Summary:    Rhythm: SR  Rate: 61-82  KS: 0.18  QRS: 0.10  QT: 0.38    Ectopy: rare PVCs and bigem    Tele strip placed in pt chart.

## 2018-04-22 NOTE — DISCHARGE PLANNING
MYA informed patient has been assigned a bed at [facility] ([id number]).  [facility] form completed and faxed to TRACI Grey for transportation arrangement assistance. MYA informed by Que that [facility] will be her at 11 am.  COBRA completed and will be provided to floor RN.

## 2018-04-22 NOTE — PROGRESS NOTES
"Pt arrived from [facility]. Oriented to unit with good understanding.  offered, Pt declined at this time, \"the cochlear implant works great\". Call bell in reach, bed in lowest position. WCTM.  "

## 2018-04-22 NOTE — CONSULTS
Cardiology consultation    asking for consultation: Dr. 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 Considers himself healthy and active although he is becoming more breathless of late.   States that he had a heart childbirth and was hearing-impaired, successful cochlear implant now barely needs a hearing aid and almost has normal hearing in his left ear which is where the implant is     He is a retired     Unfortunately he has always struggled with funding. He does not have a place to live and lives on friends' couches. His friend Immanuel is in the room with him. He is able to eat normally through assistance shelters. He has been short of breath for the last few months. His doctors visits he keeps in his been getting a workup for sleep apnea.    Sadly, he saw my partner in 2006 and 2007. He was fired from our office at that time because he couldn't pay his bill for $900. He had a Holter monitor that showed PVCs.    States that he was told on an echocardiogram at [place] last air he had a heart valve deformity. Told that he probably had it since birth and that he should watch it. Never had a CAT scan before    He has had 3 or 4 days of worsening breathlessness with exertion. He does not smoke or have seasonal allergies or asthma. He feels this happens even when he walks in the middle the night to the bathroom. It is not associated with chest heaviness, he has not had traumas or emotional stressors outside of what we detailed above  Scared him enough that he came to the emergency room    CT to rule out pulmonary embolus did, but it did show significant ascending aortic aneurysm  Echo showed that the valve/aortic root was involved    Feels well resting in bed, had a good breakfast and lunch    Past Medical History:  has a past medical history of  Cholesterol serum elevated; Enlarged tonsils; and Hearing disorder (5/20/2009).  Past Surgical History:  has a past surgical history that includes other orthopedic surgery and other.  Past Social History:  reports that he has never smoked. He has never used smokeless tobacco. He reports that he drinks alcohol. He reports that he does not use drugs.  Past Family History: No family history on file.  Allergies: Patient has no known allergies.    Current Medications:  Prior to Admission medications    Not on File       Review of Systems:  Review of Systems   Constitutional: Positive for malaise/fatigue. Negative for fever and weight loss.   HENT: Negative for congestion, ear discharge, ear pain, nosebleeds and tinnitus.         Hearing impaired since birth, he has been present for hearing loss [facility] for many years. Cochlear implant several years ago in [place]   Eyes: Negative.    Respiratory: Positive for shortness of breath. Negative for cough, hemoptysis and sputum production.    Cardiovascular: Negative for chest pain, palpitations, claudication, leg swelling and PND.   Gastrointestinal: Negative for abdominal pain, heartburn and nausea.   Musculoskeletal: Negative.    Neurological: Negative for dizziness and loss of consciousness.   Endo/Heme/Allergies: Does not bruise/bleed easily.   Psychiatric/Behavioral: Negative for depression. The patient is not nervous/anxious and does not have insomnia.    All other systems reviewed and are negative.    Blood pressure 124/72, pulse 70, temperature 37.1 °C (98.7 °F), resp. rate 16, height 1.829 m (6'), SpO2 95 %.    Physical Examination:  Physical Exam   Constitutional: He is oriented to person, place, and time. He appears well-developed and well-nourished.   Healthy-appearing, in no acute distress   HENT:   Head: Normocephalic and atraumatic.   Eyes: EOM are normal. Pupils are equal, round, and reactive to light. No scleral icterus.   Neck: No JVD  present. No thyromegaly present.   Cardiovascular: Normal rate, regular rhythm and intact distal pulses.  Exam reveals no friction rub.    Bounding pulses, carotid very visible at 90° no bruits, 2 out of 4 diastolic murmur across the precordium   Pulmonary/Chest: No respiratory distress. He exhibits no tenderness.   Abdominal: Soft. Bowel sounds are normal. He exhibits no distension.   Musculoskeletal: He exhibits no edema.   Lymphadenopathy:     He has no cervical adenopathy.   Neurological: He is alert and oriented to person, place, and time. He exhibits normal muscle tone. Coordination normal.   Skin: Skin is warm and dry.   Psychiatric: He has a normal mood and affect. His behavior is normal.     Data:  [facility] was reviewed. Significant aneurysm of the ascending aorta, no dissection, maximal AP diameter about 5 cm    Echocardiogram also reviewed by me. Normal left ventricular function 60%. No significant right ventricular failure or TR. Bicuspid aortic valve with mild to moderate regurgitation, moderate mitral regurgitation which is eccentric. 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

## 2018-04-22 NOTE — H&P
H&P  Date of Service: 2018 11:02 AM  Jerardo Ford M.D.   [facility]   Expand All Collapse All    []Hide copied text   Hospital Medicine History and Physical     Date of Service  2018     Chief Complaint      Chief Complaint   Patient presents with   • Shortness of Breath         History of Presenting Illness  67 y.o. male who presented 2018 with dyspnea on exertion worsening over the past several months. He was admitted to [facility] and was ruled out for a myocardial infarction. A CYA of the chest to rule out a PE showed a 5cm ascending AA. In addition he was noted to have an s3 and a diastolic murmur. An echo shows severe AR and MR. He will be sent to [facility] for ct surgery evaluation.          Primary Care Physician  Wayne Rios D.O.     Consultants  Dr bryce frances     Code Status  full     Review of Systems  ROS      Past Medical History       Past Medical History:   Diagnosis Date   • Hearing disorder 2009   • Cholesterol serum elevated       diet and exercise controlled   • Enlarged tonsils       removed         Surgical History  Past Surgical History:   Procedure Laterality Date   • OTHER         coclear implant left ear    • OTHER ORTHOPEDIC SURGERY         traumatic amputation left middle finger         Medications  No current facility-administered medications on file prior to encounter.       No current outpatient prescriptions on file prior to encounter.          Family History  Family History   History reviewed. No pertinent family history.        Social History           Social History   Substance Use Topics   • Smoking status: Never Smoker   • Smokeless tobacco: Never Used   • Alcohol use Yes         Comment: occasional         Allergies  No Known Allergies      Physical Exam   Laboratory   Hemodynamics  Temp (24hrs), Av.5 °C (97.7 °F), Min:36.2 °C (97.2 °F), Max:36.7 °C (98.1 °F)   Temperature: 36.4 °C (97.6 °F)  Pulse  Avg:  79  Min: 65  Max: 90    Blood Pressure : 112/73       Respiratory      Respiration: 18, Pulse Oximetry: 95 %  RUL Breath Sounds: Clear, RML Breath Sounds: Clear, RLL Breath Sounds: Diminished, TAY Breath Sounds: Clear, LLL Breath Sounds: Diminished     Physical Exam            Recent Labs      04/20/18 1818 04/21/18   0436  04/22/18   0504   WBC  4.6*  4.8  5.7   RBC  3.66*  3.86*  4.16*   HEMOGLOBIN  12.6*  12.9*  13.8*   HEMATOCRIT  37.1*  38.7*  41.8*   MCV  101.4*  100.3*  100.5*   MCH  34.4*  33.4*  33.2*   MCHC  34.0  33.3*  33.0*   RDW  53.1*  52.7*  52.7*   PLATELETCT  165  174  197   MPV  11.0  11.2  11.2            Recent Labs      04/20/18 1818 04/21/18   0436  04/22/18   0504   SODIUM  135  137  136   POTASSIUM  3.8  3.5*  4.3   CHLORIDE  108  107  103   CO2  24  25  26   GLUCOSE  107*  86  100*   BUN  26*  22  24*   CREATININE  1.34  1.25  1.47*   CALCIUM  8.5  8.6  8.9            Recent Labs      04/20/18 1818 04/21/18   0436  04/22/18   0504   ALTSGPT  42   --   34   ASTSGOT  30   --   20   ALKPHOSPHAT  62   --   61   TBILIRUBIN  0.8   --   0.9   GLUCOSE  107*  86  100*              Recent Labs      04/20/18 1818   BNPBTYPENAT  1264*                Lab Results   Component Value Date     TROPONINI 0.04 04/20/2018      Urinalysis:     Lab Results  Component Value Date/Time   SPECGRAVITY <=1.005 04/20/2018 2226   GLUCOSEUR Negative 04/20/2018 2226   KETONES Negative 04/20/2018 2226   NITRITE Negative 04/20/2018 2226         Imaging  CONCLUSIONS  Compare to prior echo from 2009 the mixed aortic valve disease has   significantly worsened and increase MR.  Left ventricular ejection fraction is visually estimated to be 55%.  Moderate aortic stenosis.  Transvalvular gradients are - Peak: 37 mmHg, Mean: 24 mmHg.  Moderate to severe aortic insufficiency.  Estimated right ventricular systolic pressure  is 59 mmHg.  Moderate to severe mitral regurgitation.  Ascending aorta is dilated with a diameter of  4.6 cm.     cta chest:  FINDINGS:    The study is extensively degraded by patient respiratory motion artifact. There is bibasilar atelectasis. There are small bilateral pleural effusions.  The heart is enlarged with a very prominent left ventricle and atrium. There is ectasia of the ascending aorta which is measured at 5 x 4.9 cm in diameter.  There are multiple mediastinal lymph nodes which measure up to 13 mm in short axis dimension.  There is a small amount of pericardial fluid.  There is fatty change of the liver.  The study is limited due to extensive respiratory motion artifact. Only large central emboli are excluded with this examination.      Assessment/Plan       I anticipate this patient will require at least two midnights for appropriate medical management, necessitating inpatient admission.         SOB (shortness of breath)- (present on admission)   Assessment & Plan     2/2 valvular heart disease.   BB therapy  Stop diuresis given evolving renal failure now.   Trend on tele  Surgery consulttion  Cards consultation                Valvular heart disease   Assessment & Plan     Severe ar and MR worsening now with symptoms.   Ct surgery consultation.           Aneurysm of ascending aorta (CMS-HCC)- (present on admission)   Assessment & Plan     5x4.9 cm aneurysm  Ct surgery consultation             HEATHER (acute kidney injury) (CMS-HCC)- (present on admission)   Assessment & Plan     Suspect chf related  Hold lasix now as worse  Continue BB  Follow cmp             Macrocytic anemia- (present on admission)   Assessment & Plan     Pending b12, folate, tsh  Follow cbc                VTE prophylaxis: heparin.

## 2018-04-22 NOTE — PROGRESS NOTES
Direct admit from [facility], Dr. Jerardo Ford for valvular heart disease/ascending aortic aneursym.  Discharge and readmit orders signed and held, need to be released upon pt arrival.  Pt coming by ground.

## 2018-04-22 NOTE — PROGRESS NOTES
Assessment completed. Pt AAOx4, no complaints this AM. Due meds given. Pt to transfer to main. Awaiting MD to speak with pt regarding transfer. Transport arranged for 11 per MYA Mobley. Pt provided with snacks/drinks. No other needs, will monitor. Friend at bedside.

## 2018-04-22 NOTE — PROGRESS NOTES
Bedside report received from Aj JACQUES. Assumed care. POC discussed. Pt resting comfortably in bed. Safety precautions in place.

## 2018-04-22 NOTE — PROGRESS NOTES
@ 2045 - Pt assessed, A&O, VSS, IV SL, no c/o pain, meds given per MAR,  Updated POC discussed with pt at the bedside, all questions answered, call light in reach

## 2018-04-22 NOTE — H&P
Hospital Medicine History and Physical    Date of Service  4/22/2018    Chief Complaint  Chief Complaint   Patient presents with   • Shortness of Breath       History of Presenting Illness  67 y.o. male who presented 4/20/2018 with dyspnea on exertion worsening over the past several months. He was admitted to [facility] and was ruled out for a myocardial infarction. A CYA of the chest to rule out a PE showed a 5cm ascending AA. In addition he was noted to have an s3 and a diastolic murmur. An echo shows severe AR and MR. He will be sent to [facility] for ct surgery evaluation.       Primary Care Physician  Wayne Rios D.O.    Consultants  Dr bryce frances    Code Status  full    Review of Systems  Review of Systems   Constitutional: Positive for malaise/fatigue. Negative for chills and fever.   HENT: Negative for sore throat.    Eyes: Negative for blurred vision and pain.   Respiratory: Positive for shortness of breath. Negative for cough.    Cardiovascular: Negative for chest pain and palpitations.   Gastrointestinal: Negative for abdominal pain, nausea and vomiting.   Genitourinary: Negative for dysuria and urgency.   Musculoskeletal: Negative for back pain and neck pain.   Skin: Negative for itching and rash.   Neurological: Negative for dizziness, tingling and headaches.   Psychiatric/Behavioral: Negative for depression. The patient does not have insomnia.    All other systems reviewed and are negative.       Past Medical History  Past Medical History:   Diagnosis Date   • Hearing disorder 5/20/2009   • Cholesterol serum elevated     diet and exercise controlled   • Enlarged tonsils     removed       Surgical History  Past Surgical History:   Procedure Laterality Date   • OTHER      coclear implant left ear 1/06   • OTHER ORTHOPEDIC SURGERY      traumatic amputation left middle finger       Medications  No current facility-administered medications on file prior to encounter.       No current outpatient prescriptions on file prior to encounter.       Family History  History reviewed. No pertinent family history.    Social History  Social History   Substance Use Topics   • Smoking status: Never Smoker   • Smokeless tobacco: Never Used   • Alcohol use Yes      Comment: occasional       Allergies  No Known Allergies     Physical Exam  Laboratory   Hemodynamics  Temp (24hrs), Av.5 °C (97.7 °F), Min:36.2 °C (97.2 °F), Max:36.7 °C (98.1 °F)   Temperature: 36.4 °C (97.6 °F)  Pulse  Av  Min: 65  Max: 90    Blood Pressure : 112/73      Respiratory      Respiration: 18, Pulse Oximetry: 95 %        RUL Breath Sounds: Clear, RML Breath Sounds: Clear, RLL Breath Sounds: Diminished, TAY Breath Sounds: Clear, LLL Breath Sounds: Diminished    Physical Exam   Constitutional: He is oriented to person, place, and time. He appears well-developed and well-nourished. No distress.   HENT:   Right Ear: External ear normal.   Left Ear: External ear normal.   Nose: Nose normal.   Eyes: Right eye exhibits no discharge. Left eye exhibits no discharge. No scleral icterus.   Neck: No JVD present. No tracheal deviation present.   Cardiovascular: Normal rate and intact distal pulses.    Murmur heard.  Cap refill 2 sec  S3 present  Diastolic murmur RHB   Pulmonary/Chest: Effort normal and breath sounds normal. No respiratory distress. He has no wheezes. He has no rales.   Abdominal: Soft. Bowel sounds are normal. He exhibits no distension. There is no tenderness. There is no guarding.   Musculoskeletal: He exhibits no edema or tenderness.   Neurological: He is alert and oriented to person, place, and time.   Skin: Skin is warm and dry. He is not diaphoretic. No erythema.   Normal skin color   Psychiatric: He has a normal mood and affect. His behavior is normal.   Nursing note and vitals reviewed.      Recent Labs      18   1818  18   0436  18   0504   WBC  4.6*  4.8  5.7   RBC  3.66*  3.86*   4.16*   HEMOGLOBIN  12.6*  12.9*  13.8*   HEMATOCRIT  37.1*  38.7*  41.8*   MCV  101.4*  100.3*  100.5*   MCH  34.4*  33.4*  33.2*   MCHC  34.0  33.3*  33.0*   RDW  53.1*  52.7*  52.7*   PLATELETCT  165  174  197   MPV  11.0  11.2  11.2     Recent Labs      04/20/18 1818  04/21/18   0436  04/22/18   0504   SODIUM  135  137  136   POTASSIUM  3.8  3.5*  4.3   CHLORIDE  108  107  103   CO2  24  25  26   GLUCOSE  107*  86  100*   BUN  26*  22  24*   CREATININE  1.34  1.25  1.47*   CALCIUM  8.5  8.6  8.9     Recent Labs      04/20/18 1818 04/21/18   0436  04/22/18   0504   ALTSGPT  42   --   34   ASTSGOT  30   --   20   ALKPHOSPHAT  62   --   61   TBILIRUBIN  0.8   --   0.9   GLUCOSE  107*  86  100*         Recent Labs      04/20/18   1818   BNPBTYPENAT  1264*         Lab Results   Component Value Date    TROPONINI 0.04 04/20/2018     Urinalysis:    Lab Results  Component Value Date/Time   SPECGRAVITY <=1.005 04/20/2018 2226   GLUCOSEUR Negative 04/20/2018 2226   KETONES Negative 04/20/2018 2226   NITRITE Negative 04/20/2018 2226        Imaging  CONCLUSIONS  Compare to prior echo from 2009 the mixed aortic valve disease has   significantly worsened and increase MR.  Left ventricular ejection fraction is visually estimated to be 55%.  Moderate aortic stenosis.  Transvalvular gradients are - Peak: 37 mmHg, Mean: 24 mmHg.  Moderate to severe aortic insufficiency.  Estimated right ventricular systolic pressure  is 59 mmHg.  Moderate to severe mitral regurgitation.  Ascending aorta is dilated with a diameter of 4.6 cm.    cta chest:  FINDINGS:    The study is extensively degraded by patient respiratory motion artifact. There is bibasilar atelectasis. There are small bilateral pleural effusions.  The heart is enlarged with a very prominent left ventricle and atrium. There is ectasia of the ascending aorta which is measured at 5 x 4.9 cm in diameter.  There are multiple mediastinal lymph nodes which measure up to 13 mm in  short axis dimension.  There is a small amount of pericardial fluid.  There is fatty change of the liver.  The study is limited due to extensive respiratory motion artifact. Only large central emboli are excluded with this examination.     Assessment/Plan     I anticipate this patient will require at least two midnights for appropriate medical management, necessitating inpatient admission.    SOB (shortness of breath)- (present on admission)   Assessment & Plan    2/2 valvular heart disease.   BB therapy  Stop diuresis given evolving renal failure now.   Trend on tele  Surgery consulttion  Cards consultation            Valvular heart disease   Assessment & Plan    Severe ar and MR worsening now with symptoms.   Ct surgery consultation.         Aneurysm of ascending aorta (CMS-HCC)- (present on admission)   Assessment & Plan    5x4.9 cm aneurysm  Ct surgery consultation          HEATHER (acute kidney injury) (CMS-[facility])- (present on admission)   Assessment & Plan    Suspect chf related  Hold lasix now as worse  Continue BB  Follow cmp          Macrocytic anemia- (present on admission)   Assessment & Plan    Pending b12, folate, tsh  Follow cbc            VTE prophylaxis: heparin.

## 2018-04-22 NOTE — DISCHARGE SUMMARY
CHIEF COMPLAINT ON ADMISSION  Chief Complaint   Patient presents with   • Shortness of Breath       CODE STATUS  Full Code    HPI & HOSPITAL COURSE  This is a 67 y.o. male here with dyspnea on exertion. He was admitted with possible CHF issues and has been found to have a 5cm ascending aneurysm, severe AR and MR. He will be transferred to [facility] for a cardiology and ct surgery consultation. I have discussed this with Dr Riya Hess and Chyna the NP with Dr Ferguson.      The patient recovered much more quickly than anticipated on admission.    Therefore, he is discharged in good and stable condition with close outpatient follow-up.    SPECIFIC OUTPATIENT FOLLOW-UP  None yet    DISCHARGE PROBLEM LIST  Active Problems:    SOB (shortness of breath) POA: Yes    Macrocytic anemia POA: Yes    HEATHER (acute kidney injury) (CMS-[facility]) POA: Yes    Aneurysm of ascending aorta (CMS-HCC) POA: Yes    ARF (acute renal failure) (CMS-HCC) POA: Unknown  Resolved Problems:    * No resolved hospital problems. *      FOLLOW UP  No future appointments.  Wayne Rios D.O.  [address] [facility] [address]  [address]  [facility] [id number]  [phone]            MEDICATIONS ON DISCHARGE  There are no discharge medications for this patient.       DIET  Orders Placed This Encounter   Procedures   • Diet Order     Standing Status:   Standing     Number of Occurrences:   1     Order Specific Question:   Diet:     Answer:   Regular [1]       ACTIVITY  As tolerated.  Weight bearing as tolerated      CONSULTATIONS  As above    PROCEDURES  cta chest:  1.  Respiratory motion degraded study. Only large central emboli are excluded with this examination.  2.  Bibasilar atelectasis and small bilateral pleural effusions.  3.  Cardiomegaly which involves the left atrium and ventricle to the greatest degree.  4.  Aneurysmal dilatation of the ascending aorta which is measured at 5 x 4.9 cm in diameter.  5.  Fatty liver.    echo  Compare to prior echo from 2009 the  mixed aortic valve disease has   significantly worsened and increase MR.  Left ventricular ejection fraction is visually estimated to be 55%.  Moderate aortic stenosis.  Transvalvular gradients are - Peak: 37 mmHg, Mean: 24 mmHg.  Moderate to severe aortic insufficiency.  Estimated right ventricular systolic pressure  is 59 mmHg.  Moderate to severe mitral regurgitation.  Ascending aorta is dilated with a diameter of 4.6 cm.    LABORATORY  Lab Results   Component Value Date/Time    SODIUM 136 04/22/2018 05:04 AM    POTASSIUM 4.3 04/22/2018 05:04 AM    CHLORIDE 103 04/22/2018 05:04 AM    CO2 26 04/22/2018 05:04 AM    GLUCOSE 100 (H) 04/22/2018 05:04 AM    BUN 24 (H) 04/22/2018 05:04 AM    CREATININE 1.47 (H) 04/22/2018 05:04 AM    CREATININE 1.0 05/15/2009 06:30 AM        Lab Results   Component Value Date/Time    WBC 5.7 04/22/2018 05:04 AM    HEMOGLOBIN 13.8 (L) 04/22/2018 05:04 AM    HEMATOCRIT 41.8 (L) 04/22/2018 05:04 AM    PLATELETCT 197 04/22/2018 05:04 AM        Total time of the discharge process exceeds 40 minutes

## 2018-04-23 LAB
ANION GAP SERPL CALC-SCNC: 6 MMOL/L (ref 0–11.9)
APTT PPP: 30.9 SEC (ref 24.7–36)
BASOPHILS # BLD AUTO: 0.9 % (ref 0–1.8)
BASOPHILS # BLD: 0.06 K/UL (ref 0–0.12)
BUN SERPL-MCNC: 28 MG/DL (ref 8–22)
CALCIUM SERPL-MCNC: 9.6 MG/DL (ref 8.5–10.5)
CHLORIDE SERPL-SCNC: 103 MMOL/L (ref 96–112)
CO2 SERPL-SCNC: 29 MMOL/L (ref 20–33)
CREAT SERPL-MCNC: 1.38 MG/DL (ref 0.5–1.4)
EOSINOPHIL # BLD AUTO: 0.11 K/UL (ref 0–0.51)
EOSINOPHIL NFR BLD: 1.6 % (ref 0–6.9)
ERYTHROCYTE [DISTWIDTH] IN BLOOD BY AUTOMATED COUNT: 55.8 FL (ref 35.9–50)
GLUCOSE SERPL-MCNC: 62 MG/DL (ref 65–99)
HCT VFR BLD AUTO: 47.8 % (ref 42–52)
HGB BLD-MCNC: 15.5 G/DL (ref 14–18)
IMM GRANULOCYTES # BLD AUTO: 0.03 K/UL (ref 0–0.11)
IMM GRANULOCYTES NFR BLD AUTO: 0.4 % (ref 0–0.9)
INR PPP: 1.08 (ref 0.87–1.13)
LYMPHOCYTES # BLD AUTO: 1.18 K/UL (ref 1–4.8)
LYMPHOCYTES NFR BLD: 17 % (ref 22–41)
MAGNESIUM SERPL-MCNC: 2.4 MG/DL (ref 1.5–2.5)
MCH RBC QN AUTO: 33.5 PG (ref 27–33)
MCHC RBC AUTO-ENTMCNC: 32.4 G/DL (ref 33.7–35.3)
MCV RBC AUTO: 103.5 FL (ref 81.4–97.8)
MONOCYTES # BLD AUTO: 0.74 K/UL (ref 0–0.85)
MONOCYTES NFR BLD AUTO: 10.7 % (ref 0–13.4)
NEUTROPHILS # BLD AUTO: 4.82 K/UL (ref 1.82–7.42)
NEUTROPHILS NFR BLD: 69.4 % (ref 44–72)
NRBC # BLD AUTO: 0 K/UL
NRBC BLD-RTO: 0 /100 WBC
PLATELET # BLD AUTO: 216 K/UL (ref 164–446)
PMV BLD AUTO: 10.9 FL (ref 9–12.9)
POTASSIUM SERPL-SCNC: 4.1 MMOL/L (ref 3.6–5.5)
PROTHROMBIN TIME: 13.7 SEC (ref 12–14.6)
RBC # BLD AUTO: 4.62 M/UL (ref 4.7–6.1)
SODIUM SERPL-SCNC: 138 MMOL/L (ref 135–145)
WBC # BLD AUTO: 6.9 K/UL (ref 4.8–10.8)

## 2018-04-23 PROCEDURE — 700102 HCHG RX REV CODE 250 W/ 637 OVERRIDE(OP): Performed by: INTERNAL MEDICINE

## 2018-04-23 PROCEDURE — 85730 THROMBOPLASTIN TIME PARTIAL: CPT

## 2018-04-23 PROCEDURE — 99233 SBSQ HOSP IP/OBS HIGH 50: CPT | Performed by: HOSPITALIST

## 2018-04-23 PROCEDURE — 700102 HCHG RX REV CODE 250 W/ 637 OVERRIDE(OP): Performed by: HOSPITALIST

## 2018-04-23 PROCEDURE — 83735 ASSAY OF MAGNESIUM: CPT

## 2018-04-23 PROCEDURE — 85025 COMPLETE CBC W/AUTO DIFF WBC: CPT

## 2018-04-23 PROCEDURE — 36415 COLL VENOUS BLD VENIPUNCTURE: CPT

## 2018-04-23 PROCEDURE — A9270 NON-COVERED ITEM OR SERVICE: HCPCS | Performed by: INTERNAL MEDICINE

## 2018-04-23 PROCEDURE — A9270 NON-COVERED ITEM OR SERVICE: HCPCS | Performed by: HOSPITALIST

## 2018-04-23 PROCEDURE — 80048 BASIC METABOLIC PNL TOTAL CA: CPT

## 2018-04-23 PROCEDURE — 700111 HCHG RX REV CODE 636 W/ 250 OVERRIDE (IP): Performed by: HOSPITALIST

## 2018-04-23 PROCEDURE — 85610 PROTHROMBIN TIME: CPT

## 2018-04-23 PROCEDURE — 770020 HCHG ROOM/CARE - TELE (206)

## 2018-04-23 RX ORDER — HYDRALAZINE HYDROCHLORIDE 10 MG/1
10 TABLET, FILM COATED ORAL EVERY 8 HOURS
Status: DISCONTINUED | OUTPATIENT
Start: 2018-04-23 | End: 2018-04-26

## 2018-04-23 RX ORDER — HYDRALAZINE HYDROCHLORIDE 25 MG/1
25 TABLET, FILM COATED ORAL EVERY 8 HOURS
Status: DISCONTINUED | OUTPATIENT
Start: 2018-04-23 | End: 2018-04-23

## 2018-04-23 RX ADMIN — HYDRALAZINE HYDROCHLORIDE 50 MG: 50 TABLET, FILM COATED ORAL at 06:14

## 2018-04-23 RX ADMIN — HYDRALAZINE HYDROCHLORIDE 10 MG: 10 TABLET, FILM COATED ORAL at 15:52

## 2018-04-23 RX ADMIN — ATORVASTATIN CALCIUM 40 MG: 40 TABLET, FILM COATED ORAL at 17:32

## 2018-04-23 RX ADMIN — HYDRALAZINE HYDROCHLORIDE 10 MG: 10 TABLET, FILM COATED ORAL at 21:14

## 2018-04-23 RX ADMIN — METOPROLOL TARTRATE 25 MG: 25 TABLET, FILM COATED ORAL at 17:32

## 2018-04-23 RX ADMIN — HEPARIN SODIUM 5000 UNITS: 5000 INJECTION, SOLUTION INTRAVENOUS; SUBCUTANEOUS at 21:13

## 2018-04-23 RX ADMIN — HEPARIN SODIUM 5000 UNITS: 5000 INJECTION, SOLUTION INTRAVENOUS; SUBCUTANEOUS at 06:14

## 2018-04-23 RX ADMIN — METOPROLOL TARTRATE 25 MG: 25 TABLET, FILM COATED ORAL at 08:01

## 2018-04-23 RX ADMIN — HEPARIN SODIUM 5000 UNITS: 5000 INJECTION, SOLUTION INTRAVENOUS; SUBCUTANEOUS at 15:53

## 2018-04-23 ASSESSMENT — PAIN SCALES - GENERAL
PAINLEVEL_OUTOF10: 0

## 2018-04-23 ASSESSMENT — LIFESTYLE VARIABLES: SUBSTANCE_ABUSE: 0

## 2018-04-23 ASSESSMENT — ENCOUNTER SYMPTOMS
COUGH: 0
DIZZINESS: 0
FOCAL WEAKNESS: 0
HEADACHES: 0
NECK PAIN: 0
VOMITING: 0
WHEEZING: 0
MYALGIAS: 0
SENSORY CHANGE: 0
DIAPHORESIS: 0
LOSS OF CONSCIOUSNESS: 0
WEAKNESS: 0
HEMOPTYSIS: 0
PND: 0
SPEECH CHANGE: 0
PALPITATIONS: 0
SHORTNESS OF BREATH: 1
BACK PAIN: 0
CLAUDICATION: 0
DEPRESSION: 0
ABDOMINAL PAIN: 0
CONSTIPATION: 0
ORTHOPNEA: 0
CHILLS: 0
EYE DISCHARGE: 0
SORE THROAT: 0
FEVER: 0
DIARRHEA: 0
EYE PAIN: 0
NAUSEA: 0
SHORTNESS OF BREATH: 0
BRUISES/BLEEDS EASILY: 0
WEIGHT LOSS: 0
SPUTUM PRODUCTION: 0

## 2018-04-23 NOTE — PROGRESS NOTES
Assumed care of pt, call light w/in reach, and fall prec in doretha ce.  Bed locked in lowest position. Pt instructed to call for assistance before getting out of bed.  All questions answered, no needs at this time.

## 2018-04-23 NOTE — PROGRESS NOTES
Transfer from [facility] admitted by Dr. Ford arrived, stable.  Cardiology and CTS aware of patient with ascending aortic aneurysm , mild to moderate AS and AI.

## 2018-04-23 NOTE — PROGRESS NOTES
Assumed care at 0715. Bedside report received from Night RN Laura. Patient's chart and MAR reviewed. 12 hour chart check complete. Assessment complete, pt has no complaints of pain at this time. Pt is awake in bed. Pt is A & O x 4. Patient was updated on plan of care for the day. Questions answered and concerns addressed.  Pt denies any additional needs at this time. White board updated. Call light, phone and personal belongings within reach. Bed alarm on and working appropriately. Vital signs stable.

## 2018-04-23 NOTE — CARE PLAN
Problem: Safety  Goal: Will remain free from falls  Educated pt on importance of calling before ambulating r/t first time taking BP meds and risks of orthostatic hypotension.    Problem: Venous Thromboembolism (VTW)/Deep Vein Thrombosis (DVT) Prevention:  Goal: Patient will participate in Venous Thrombosis (VTE)/Deep Vein Thrombosis (DVT)Prevention Measures  Educated pt on the use of heparin subq and the importance of ambulation while in the hospital to prevent DVT

## 2018-04-23 NOTE — DISCHARGE PLANNING
Transition Team Assessment     *assessment done by SW intern yesterday*    SW intern met with patient at bedside. Pt plans to discharge with friends when medically able. Pt reports he stays with friends. Pt is planning a long term trip to [place] where he will be staying with his mother and sister soon after his discharge. Pt has no questions or concerns at this time.      Information Source  Orientation : Oriented x 4  Information Given By: Patient  Informant's Name: FABIOLA   Who is responsible for making decisions for patient? : Patient     Readmission Evaluation  Is this a readmission?: No     Elopement Risk  Legal Hold: No  Ambulatory or Self Mobile in Wheelchair: Yes  Disoriented: No  Psychiatric Symptoms: None  History of Wandering: No  Elopement Risk: Not at Risk for Elopement     Interdisciplinary Discharge Planning  Does Admitting Nurse Feel This Could be a Complex Discharge?: No  Primary Care Physician: Dr. Rios   Lives with - Patient's Self Care Capacity: Friends  Patient or legal guardian wants to designate a caregiver (see row info): No  Support Systems: Friends / Neighbors  Housing / Facility: [address]  Do You Take your Prescribed Medications Regularly: No  Able to Return to Previous ADL's: Yes  Mobility Issues: No  Prior Services: None  Patient Expects to be Discharged to:: Home  Assistance Needed: Unknown at this Time  Durable Medical Equipment: Not Applicable     Discharge Preparedness  What is your plan after discharge?: Home with help  What are your discharge supports?: Other (comment) (lives at diffrent friends houses )  Prior Functional Level: Independent with Activities of Daily Living  Difficulity with ADLs: None  Difficulity with IADLs: None     Functional Assesment  Prior Functional Level: Independent with Activities of Daily Living     Finances  Financial Barriers to Discharge: No     Vision / Hearing Impairment  Vision Impairment : Yes  Right Eye Vision: Impaired, Wears  Glasses  Left Eye Vision: Impaired, Wears Glasses  Hearing Impairment : Yes  Hearing Impairment: Both Ears, Hearing Device(s) Available     Values / Beliefs / Concerns  Values / Beliefs Concerns : No     Advance Directive  Advance Directive?: None  Advance Directive offered?: AD Booklet refused     Domestic Abuse  Have you ever been the victim of abuse or violence?: No  Physical Abuse or Sexual Abuse: No  Verbal Abuse or Emotional Abuse: No  Possible Abuse Reported to:: Not Applicable     Psychological Assessment  History of Substance Abuse: None  History of Psychiatric Problems: No  Non-compliant with Treatment: No  Newly Diagnosed Illness: No     Discharge Risks or Barriers  Discharge risks or barriers?: No     Anticipated Discharge Information  Anticipated discharge disposition: Home  Discharge Address: [address] [place] [address]  Discharge Contact Phone Number: [phone]     This note has been reviewed by Leandra CAMARA

## 2018-04-23 NOTE — CARE PLAN
Problem: Communication  Goal: The ability to communicate needs accurately and effectively will improve  Outcome: PROGRESSING AS EXPECTED  Pt able to communicate with staff with cochlear implant in left ear and by reading lips.     Problem: Knowledge Deficit  Goal: Knowledge of disease process/condition, treatment plan, diagnostic tests, and medications will improve  Outcome: PROGRESSING AS EXPECTED  Pt educated regarding plan of care for the day, activity, diet, and medications. Verbalized understanding.

## 2018-04-23 NOTE — PROGRESS NOTES
Cardiology Progress Note               Author: Dee Bello Date & Time created: 4/23/2018  7:53 AM     Interval History:  67 year old male who presented to [facility] with complaints of progressive shortness of breath on 4/20/18. CT of the chest showed a 5 x 4.9 cm ascending aortic aneurysm. An echo was also performed showing moderate aortic stenosis, moderate to severe aortic insufficiency and severe mitral regurgitation. Patient was transferred here on 4/22/18 for surgical consultation.     Patient is slightly anxious about the possibility of surgery as his mother who is 92 and lives in [facility] also apparently has severe heart problems and has been told she has 6 months to live.     Past medical history significant for cochlear implant and hyperlipidemia.      4/23/18: Patient states that he is feeling well. Denies chest pain or palpitations. States that he has shortness of breath only with exertion.    Monitor: SR 61-78 with frequent PVC's, Trigem and couplets    Labs:   RBC 4.16  Hgb 13.8  Hct 41.8    Bun 24  Cr 1.47  GFR 48    Review of Systems   Constitutional: Negative for malaise/fatigue and weight loss.   Respiratory: Positive for shortness of breath (Exertional).    Cardiovascular: Negative for chest pain, palpitations, orthopnea, claudication, leg swelling and PND.   Neurological: Negative for dizziness and weakness.   All other systems reviewed and are negative.      Physical Exam   Constitutional: He is oriented to person, place, and time. He appears well-developed and well-nourished. No distress.   HENT:   Head: Normocephalic.   Eyes: EOM are normal.   Cardiovascular: Normal rate and regular rhythm.    Murmur heard.   Systolic murmur is present with a grade of 3/6   Pulses:       Radial pulses are 2+ on the right side, and 2+ on the left side.        Dorsalis pedis pulses are 2+ on the right side, and 2+ on the left side.   Pulmonary/Chest: Effort normal and breath sounds normal.    Musculoskeletal: He exhibits no edema.   Neurological: He is alert and oriented to person, place, and time.   Skin: Skin is warm and dry.   Psychiatric: He has a normal mood and affect. His behavior is normal.       Hemodynamics:  Temp (24hrs), Av.8 °C (98.2 °F), Min:36.4 °C (97.5 °F), Max:37.1 °C (98.7 °F)  Temperature: 36.5 °C (97.7 °F)  Pulse  Av.3  Min: 68  Max: 77   Blood Pressure : (!) 98/59 (Nurse notified.)     Respiratory:    Respiration: 18, Pulse Oximetry: 93 %     Work Of Breathing / Effort: Mild     Fluids:     Weight: 78.2 kg (172 lb 6.4 oz)  GI/Nutrition:  Orders Placed This Encounter   Procedures   • Diet Order     Standing Status:   Standing     Number of Occurrences:   1     Order Specific Question:   Diet:     Answer:   Regular [1]     Lab Results:  Recent Labs      18   0436  18   0504   WBC  4.6*  4.8  5.7   RBC  3.66*  3.86*  4.16*   HEMOGLOBIN  12.6*  12.9*  13.8*   HEMATOCRIT  37.1*  38.7*  41.8*   MCV  101.4*  100.3*  100.5*   MCH  34.4*  33.4*  33.2*   MCHC  34.0  33.3*  33.0*   RDW  53.1*  52.7*  52.7*   PLATELETCT  165  174  197   MPV  11.0  11.2  11.2     Recent Labs      18   0436  18   0504   SODIUM  135  137  136   POTASSIUM  3.8  3.5*  4.3   CHLORIDE  108  107  103   CO2  24  25  26   GLUCOSE  107*  86  100*   BUN  26*  22  24*   CREATININE  1.34  1.25  1.47*   CALCIUM  8.5  8.6  8.9         Recent Labs      18   BNPBTYPENAT  1264*     Recent Labs      18   TROPONINI  0.04   BNPBTYPENAT  1264*          Echo 18:  Compare to prior echo from  the mixed aortic valve disease has   significantly worsened and increase MR. Left ventricular ejection fraction is visually estimated to be 55%. Moderate aortic stenosis.  Transvalvular gradients are - Peak: 37 mmHg, Mean: 24 mmHg. Moderate to severe aortic insufficiency. Estimated right ventricular systolic pressure  is 59 mmHg. Moderate to  severe mitral regurgitation.  Ascending aorta is dilated with a diameter of 4.6 cm.    Medical Decision Making, by Problem:  Active Hospital Problems    Diagnosis   • SOB (shortness of breath) [R06.02]   • Valvular heart disease [I38]   • Aneurysm of ascending aorta (CMS-HCC) [I71.2]   • HEATHER (acute kidney injury) (CMS-[facility]) [N17.9]   • Hyperlipidemia [E78.5]       Plan:  SOB/Valvuar heart disease: Echo showing moderate aortic stenosis, moderate to severe aortic insufficiency and severe mitral regurgitation. SBP running in the 90's, will reduce the hydralazine to 25mg TID. Continue metoprolol 25 mg BID.     Aneurysm of ascending aorta : 5 x 4.9 cm on CT.     HLD: Last LDL in 01/2017 was 162, . Atorvastatin 40 mg daily.     HEATHER: Holding lasix. Primary following.     Patient is feeling generally well. Frequent PVC's on the monitor overnight, patient was asymptomatic.     Will await CTS consultation and recommendations.      We will continue to follow alongside you in the care of this patient. Please let us know if you have any questions or concerns.        Quality-Core Measures   Reviewed items::  EKG reviewed, Labs reviewed, Medications reviewed and Radiology images reviewed

## 2018-04-24 LAB
ANION GAP SERPL CALC-SCNC: 6 MMOL/L (ref 0–11.9)
ANION GAP SERPL CALC-SCNC: 7 MMOL/L (ref 0–11.9)
BASOPHILS # BLD AUTO: 0.9 % (ref 0–1.8)
BASOPHILS # BLD: 0.05 K/UL (ref 0–0.12)
BUN SERPL-MCNC: 25 MG/DL (ref 8–22)
BUN SERPL-MCNC: 26 MG/DL (ref 8–22)
CALCIUM SERPL-MCNC: 8.8 MG/DL (ref 8.5–10.5)
CALCIUM SERPL-MCNC: 9.4 MG/DL (ref 8.5–10.5)
CHLORIDE SERPL-SCNC: 104 MMOL/L (ref 96–112)
CHLORIDE SERPL-SCNC: 105 MMOL/L (ref 96–112)
CO2 SERPL-SCNC: 25 MMOL/L (ref 20–33)
CO2 SERPL-SCNC: 26 MMOL/L (ref 20–33)
CREAT SERPL-MCNC: 1.15 MG/DL (ref 0.5–1.4)
CREAT SERPL-MCNC: 1.39 MG/DL (ref 0.5–1.4)
EOSINOPHIL # BLD AUTO: 0.23 K/UL (ref 0–0.51)
EOSINOPHIL NFR BLD: 4.2 % (ref 0–6.9)
ERYTHROCYTE [DISTWIDTH] IN BLOOD BY AUTOMATED COUNT: 53 FL (ref 35.9–50)
ERYTHROCYTE [DISTWIDTH] IN BLOOD BY AUTOMATED COUNT: 54.2 FL (ref 35.9–50)
FOLATE SERPL-MCNC: 14 NG/ML
GLUCOSE SERPL-MCNC: 103 MG/DL (ref 65–99)
GLUCOSE SERPL-MCNC: 106 MG/DL (ref 65–99)
HCT VFR BLD AUTO: 43 % (ref 42–52)
HCT VFR BLD AUTO: 46 % (ref 42–52)
HGB BLD-MCNC: 14.3 G/DL (ref 14–18)
HGB BLD-MCNC: 15.2 G/DL (ref 14–18)
IMM GRANULOCYTES # BLD AUTO: 0.02 K/UL (ref 0–0.11)
IMM GRANULOCYTES NFR BLD AUTO: 0.4 % (ref 0–0.9)
INR PPP: 1.14 (ref 0.87–1.13)
LYMPHOCYTES # BLD AUTO: 1.3 K/UL (ref 1–4.8)
LYMPHOCYTES NFR BLD: 23.8 % (ref 22–41)
MCH RBC QN AUTO: 33.7 PG (ref 27–33)
MCH RBC QN AUTO: 33.7 PG (ref 27–33)
MCHC RBC AUTO-ENTMCNC: 33 G/DL (ref 33.7–35.3)
MCHC RBC AUTO-ENTMCNC: 33.3 G/DL (ref 33.7–35.3)
MCV RBC AUTO: 101.4 FL (ref 81.4–97.8)
MCV RBC AUTO: 102 FL (ref 81.4–97.8)
MONOCYTES # BLD AUTO: 0.53 K/UL (ref 0–0.85)
MONOCYTES NFR BLD AUTO: 9.7 % (ref 0–13.4)
NEUTROPHILS # BLD AUTO: 3.34 K/UL (ref 1.82–7.42)
NEUTROPHILS NFR BLD: 61 % (ref 44–72)
NRBC # BLD AUTO: 0 K/UL
NRBC BLD-RTO: 0 /100 WBC
PLATELET # BLD AUTO: 207 K/UL (ref 164–446)
PLATELET # BLD AUTO: 217 K/UL (ref 164–446)
PMV BLD AUTO: 11 FL (ref 9–12.9)
PMV BLD AUTO: 11.2 FL (ref 9–12.9)
POTASSIUM SERPL-SCNC: 4.3 MMOL/L (ref 3.6–5.5)
POTASSIUM SERPL-SCNC: 4.5 MMOL/L (ref 3.6–5.5)
PROTHROMBIN TIME: 14.3 SEC (ref 12–14.6)
RBC # BLD AUTO: 4.24 M/UL (ref 4.7–6.1)
RBC # BLD AUTO: 4.51 M/UL (ref 4.7–6.1)
SODIUM SERPL-SCNC: 135 MMOL/L (ref 135–145)
SODIUM SERPL-SCNC: 138 MMOL/L (ref 135–145)
VIT B12 SERPL-MCNC: 357 PG/ML (ref 211–911)
WBC # BLD AUTO: 5.5 K/UL (ref 4.8–10.8)
WBC # BLD AUTO: 5.9 K/UL (ref 4.8–10.8)

## 2018-04-24 PROCEDURE — 87641 MR-STAPH DNA AMP PROBE: CPT

## 2018-04-24 PROCEDURE — 700111 HCHG RX REV CODE 636 W/ 250 OVERRIDE (IP): Performed by: HOSPITALIST

## 2018-04-24 PROCEDURE — A9270 NON-COVERED ITEM OR SERVICE: HCPCS | Performed by: HOSPITALIST

## 2018-04-24 PROCEDURE — 770020 HCHG ROOM/CARE - TELE (206)

## 2018-04-24 PROCEDURE — 82607 VITAMIN B-12: CPT

## 2018-04-24 PROCEDURE — 85027 COMPLETE CBC AUTOMATED: CPT

## 2018-04-24 PROCEDURE — 85610 PROTHROMBIN TIME: CPT

## 2018-04-24 PROCEDURE — 80048 BASIC METABOLIC PNL TOTAL CA: CPT | Mod: 91

## 2018-04-24 PROCEDURE — 700102 HCHG RX REV CODE 250 W/ 637 OVERRIDE(OP): Performed by: INTERNAL MEDICINE

## 2018-04-24 PROCEDURE — 700102 HCHG RX REV CODE 250 W/ 637 OVERRIDE(OP): Performed by: HOSPITALIST

## 2018-04-24 PROCEDURE — 36415 COLL VENOUS BLD VENIPUNCTURE: CPT

## 2018-04-24 PROCEDURE — 99233 SBSQ HOSP IP/OBS HIGH 50: CPT | Performed by: INTERNAL MEDICINE

## 2018-04-24 PROCEDURE — 85025 COMPLETE CBC W/AUTO DIFF WBC: CPT

## 2018-04-24 PROCEDURE — 93880 EXTRACRANIAL BILAT STUDY: CPT

## 2018-04-24 PROCEDURE — A9270 NON-COVERED ITEM OR SERVICE: HCPCS | Performed by: INTERNAL MEDICINE

## 2018-04-24 PROCEDURE — 700105 HCHG RX REV CODE 258: Performed by: INTERNAL MEDICINE

## 2018-04-24 PROCEDURE — 82746 ASSAY OF FOLIC ACID SERUM: CPT

## 2018-04-24 RX ORDER — SODIUM CHLORIDE, SODIUM LACTATE, POTASSIUM CHLORIDE, CALCIUM CHLORIDE 600; 310; 30; 20 MG/100ML; MG/100ML; MG/100ML; MG/100ML
INJECTION, SOLUTION INTRAVENOUS CONTINUOUS
Status: DISCONTINUED | OUTPATIENT
Start: 2018-04-24 | End: 2018-04-26 | Stop reason: HOSPADM

## 2018-04-24 RX ORDER — SODIUM CHLORIDE 9 MG/ML
INJECTION, SOLUTION INTRAVENOUS CONTINUOUS
Status: DISCONTINUED | OUTPATIENT
Start: 2018-04-24 | End: 2018-04-24

## 2018-04-24 RX ADMIN — HEPARIN SODIUM 5000 UNITS: 5000 INJECTION, SOLUTION INTRAVENOUS; SUBCUTANEOUS at 05:37

## 2018-04-24 RX ADMIN — METOPROLOL TARTRATE 25 MG: 25 TABLET, FILM COATED ORAL at 17:08

## 2018-04-24 RX ADMIN — HYDRALAZINE HYDROCHLORIDE 10 MG: 10 TABLET, FILM COATED ORAL at 15:14

## 2018-04-24 RX ADMIN — ATORVASTATIN CALCIUM 40 MG: 40 TABLET, FILM COATED ORAL at 20:40

## 2018-04-24 RX ADMIN — HYDRALAZINE HYDROCHLORIDE 10 MG: 10 TABLET, FILM COATED ORAL at 05:37

## 2018-04-24 RX ADMIN — METOPROLOL TARTRATE 25 MG: 25 TABLET, FILM COATED ORAL at 05:37

## 2018-04-24 RX ADMIN — SODIUM CHLORIDE, POTASSIUM CHLORIDE, SODIUM LACTATE AND CALCIUM CHLORIDE: 600; 310; 30; 20 INJECTION, SOLUTION INTRAVENOUS at 18:10

## 2018-04-24 ASSESSMENT — ENCOUNTER SYMPTOMS
HEADACHES: 0
DIZZINESS: 0
NAUSEA: 0
PALPITATIONS: 0
COUGH: 0
SHORTNESS OF BREATH: 1
ORTHOPNEA: 0
VOMITING: 0

## 2018-04-24 ASSESSMENT — PAIN SCALES - GENERAL
PAINLEVEL_OUTOF10: 0

## 2018-04-24 NOTE — ASSESSMENT & PLAN NOTE
- Improved. He will continue with oral hydration at home. Continue outpatient monitoring of his kidney function. Continue to avoid nephrotoxins, and continue to renally dose all medications.

## 2018-04-24 NOTE — PROGRESS NOTES
[facility] Hospitalist Progress Note    Date of Service: 2018    Chief Complaint  67 y.o. male admitted 2018 with worsening dyspnea on exertion. Initially seen at [facility], and was ruled out for MI. CT of the chest did not show PE, but showed 5 cm ascending aneurysm without dissection. Echocardiogram showed normal EF, with bicuspid aortic valve with moderate to severe mitral regurgitation, moderate AS. A nuclear cardiac stress test was negative. Cardiology was consulted, and recommended CT surgery consultation. , total cholesterol of 230. Started on metoprolol and hydralazine, and Lipitor. Also has acute kidney injury.    Interval Problem Update  2018 - no overnight events. Remains hemodynamically stable and afebrile. Stable on RA. Blood pressure soft but normal. Heart rate in the low 50s. BMP 1.39. Awaiting CT surgery consultation. Scheduled for cardiac cath tomorrow.     > Seen and examined. No complaints. No Cp, SOB, nausea, vomiting, abd pain, diarrhea. In good spirits.     Consultants/Specialty  Cardiology/Dr. Hoyt  CTS     Disposition  Monitor on telemetry.      Review of Systems     Pertinent positives/negatives as mentioned above.     A complete review of systems was done. All other systems were negative.      Physical Exam  Laboratory/Imaging   Hemodynamics  Temp (24hrs), Av.8 °C (98.2 °F), Min:36.3 °C (97.3 °F), Max:37.1 °C (98.8 °F)   Temperature: 37.1 °C (98.7 °F)  Pulse  Av.3  Min: 52  Max: 91    Blood Pressure : (!) 98/62 (RN notified)      Respiratory      Respiration: 15, Pulse Oximetry: 96 %     Work Of Breathing / Effort: Mild       Fluids    Intake/Output Summary (Last 24 hours) at 18 0916  Last data filed at 18 1600   Gross per 24 hour   Intake              540 ml   Output              400 ml   Net              140 ml       Nutrition  Orders Placed This Encounter   Procedures   • Diet Order     Standing Status:   Standing     Number of  Occurrences:   1     Order Specific Question:   Diet:     Answer:   Regular [1]     Physical Exam   Constitutional: He is oriented to person, place, and time. He appears well-developed and well-nourished. No distress.   HENT:   Head: Normocephalic and atraumatic.   Mouth/Throat: Oropharynx is clear and moist. No oropharyngeal exudate.   Eyes: EOM are normal. Pupils are equal, round, and reactive to light. Right eye exhibits no discharge. Left eye exhibits no discharge. No scleral icterus.   Neck: Normal range of motion. Neck supple. No thyromegaly present.   Cardiovascular: Normal rate and regular rhythm.  Exam reveals no gallop and no friction rub.    Murmur ( subtle systolic and diastolic murmur) heard.  Pulmonary/Chest: Effort normal and breath sounds normal. He has no wheezes. He has no rales. He exhibits no tenderness.   Abdominal: Soft. Bowel sounds are normal. There is no tenderness. There is no rebound and no guarding.   Musculoskeletal: Normal range of motion. He exhibits no edema or tenderness.   Lymphadenopathy:     He has no cervical adenopathy.   Neurological: He is alert and oriented to person, place, and time. No cranial nerve deficit. Coordination normal.   Skin: Skin is warm and dry. No rash noted. He is not diaphoretic. No erythema.   Psychiatric: He has a normal mood and affect. His behavior is normal. Thought content normal.   Vitals reviewed.      Recent Labs      04/22/18   0504  04/23/18   1104  04/24/18   0337   WBC  5.7  6.9  5.5   RBC  4.16*  4.62*  4.24*   HEMOGLOBIN  13.8*  15.5  14.3   HEMATOCRIT  41.8*  47.8  43.0   MCV  100.5*  103.5*  101.4*   MCH  33.2*  33.5*  33.7*   MCHC  33.0*  32.4*  33.3*   RDW  52.7*  55.8*  53.0*   PLATELETCT  197  216  207   MPV  11.2  10.9  11.2     Recent Labs      04/22/18   0504  04/23/18   1104  04/24/18   0337   SODIUM  136  138  138   POTASSIUM  4.3  4.1  4.3   CHLORIDE  103  103  105   CO2  26  29  26   GLUCOSE  100*  62*  106*   BUN  24*  28*  25*    CREATININE  1.47*  1.38  1.15   CALCIUM  8.9  9.6  8.8     Recent Labs      04/23/18   1104   APTT  30.9   INR  1.08                  Assessment/Plan     * Aneurysm of ascending aorta (CMS-HCC)- (present on admission)   Assessment & Plan    - CTA chest with 5 cm x 4.9cm ascending aortic aneurysm, no dissection.  - await inputs from CT surgery.   - continue to optimize BP control. Continue metoprolol, and hydralazine. Continue Lipitor.        Valvular heart disease (bicuspid aortic valve with moderate to severe mitral regurgitation, moderate AS)- (present on admission)   Assessment & Plan    - Awaiting CT surgery recommendations.  - Cardiology planning for left heart cath tomorrow, in anticipation for TAVR if not a surgical candidate.  - Continue to optimize blood pressure. Continue Lopressor, and hydralazine.         HEATHER (acute kidney injury) (CMS-HCC)- (present on admission)   Assessment & Plan    - Continue to hold Lasix. With plan for left heart cath tomorrow, I will start him on IV fluids with lactated Ringer at 75 mL per hour. Trend renal function. BMP in the morning.  - avoid nephrotoxins, and continue to renally dose all medications.        Hyperlipidemia- (present on admission)   Assessment & Plan    - Continue Lipitor.        Hearing disorder- (present on admission)   Assessment & Plan    - S/p cochlear implant. Uses hearing aids.          Quality-Core Measures   Reviewed items::  Labs reviewed, Medications reviewed and Radiology images reviewed  Gutierrez catheter::  No Gutierrez  DVT prophylaxis pharmacological::  Heparin  Ulcer Prophylaxis::  Not indicated  Assessed for rehabilitation services:  Patient unable to tolerate rehabilitation therapeutic regimen

## 2018-04-24 NOTE — PROGRESS NOTES
[facility] Hospitalist Progress Note    Date of Service: 2018    Chief Complaint  67 y.o. male admitted 2018 with worsening GUALLPA, NM negative, CTA with 5 cm ascending aneurysm, no dissection, severe AR and   Originally admitted at [facility] on 18 and transferred to [facility] for CTS consult.      Interval Problem Update  :  No SOB, no chest pain, BP low on metoprolol and hydralazine, cut back hydralazine from 25 to 10mg po tid (for afterload reduction).  States wants to go to [facility] to help his mother move out of her condo first.  Explained he should not be doing strenuous lifting or exercise.    Consultants/Specialty  Cardiology/Dr. Evelina COBB pending    Disposition  Spoke with cardiology, await CTS consult in house.        Review of Systems   Constitutional: Negative for chills, diaphoresis, fever and malaise/fatigue.   HENT: Negative for congestion and sore throat.    Eyes: Negative for pain and discharge.   Respiratory: Negative for cough, hemoptysis, sputum production, shortness of breath and wheezing.         GUALLPA   Cardiovascular: Negative for chest pain, palpitations, claudication and leg swelling.   Gastrointestinal: Negative for abdominal pain, constipation, diarrhea, melena, nausea and vomiting.   Genitourinary: Negative for dysuria, frequency and urgency.   Musculoskeletal: Negative for back pain, joint pain, myalgias and neck pain.   Skin: Negative for itching and rash.   Neurological: Negative for dizziness, sensory change, speech change, focal weakness, loss of consciousness, weakness and headaches.   Endo/Heme/Allergies: Does not bruise/bleed easily.   Psychiatric/Behavioral: Negative for depression, substance abuse and suicidal ideas.      Physical Exam  Laboratory/Imaging   Hemodynamics  Temp (24hrs), Av.6 °C (97.8 °F), Min:36.3 °C (97.3 °F), Max:37.1 °C (98.8 °F)   Temperature: 37.1 °C (98.8 °F)  Pulse  Av.2  Min: 68  Max: 91    Blood Pressure : 106/64       Respiratory      Respiration: 16, Pulse Oximetry: 96 %     Work Of Breathing / Effort: Mild       Fluids    Intake/Output Summary (Last 24 hours) at 04/23/18 2151  Last data filed at 04/23/18 1600   Gross per 24 hour   Intake              780 ml   Output              400 ml   Net              380 ml       Nutrition  Orders Placed This Encounter   Procedures   • Diet Order     Standing Status:   Standing     Number of Occurrences:   1     Order Specific Question:   Diet:     Answer:   Regular [1]     Physical Exam   Constitutional: He is oriented to person, place, and time. He appears well-developed and well-nourished. No distress.   In NAD.   HENT:   Head: Normocephalic and atraumatic.   Mouth/Throat: Oropharynx is clear and moist. No oropharyngeal exudate.   Eyes: Conjunctivae and EOM are normal. Pupils are equal, round, and reactive to light. Right eye exhibits no discharge. Left eye exhibits no discharge. No scleral icterus.   Neck: Normal range of motion. Neck supple. No JVD present. No tracheal deviation present. No thyromegaly present.   Cardiovascular: Normal rate and regular rhythm.  Exam reveals no gallop and no friction rub.    Murmur (diastolic and systolic murmur heard 3/6) heard.  Pulmonary/Chest: Effort normal and breath sounds normal. No respiratory distress. He has no wheezes. He has no rales. He exhibits no tenderness.   Abdominal: Soft. Bowel sounds are normal. He exhibits no distension and no mass. There is no tenderness. There is no rebound and no guarding.   Musculoskeletal: Normal range of motion. He exhibits no edema or tenderness.   Lymphadenopathy:     He has no cervical adenopathy.   Neurological: He is alert and oriented to person, place, and time. No cranial nerve deficit. He exhibits normal muscle tone.   Skin: Skin is warm and dry. No rash noted. He is not diaphoretic. No erythema.   Psychiatric: He has a normal mood and affect. His behavior is normal. Judgment and thought content  normal.   Nursing note and vitals reviewed.      Recent Labs      04/21/18   0436  04/22/18   0504  04/23/18   1104   WBC  4.8  5.7  6.9   RBC  3.86*  4.16*  4.62*   HEMOGLOBIN  12.9*  13.8*  15.5   HEMATOCRIT  38.7*  41.8*  47.8   MCV  100.3*  100.5*  103.5*   MCH  33.4*  33.2*  33.5*   MCHC  33.3*  33.0*  32.4*   RDW  52.7*  52.7*  55.8*   PLATELETCT  174  197  216   MPV  11.2  11.2  10.9     Recent Labs      04/21/18   0436  04/22/18   0504  04/23/18   1104   SODIUM  137  136  138   POTASSIUM  3.5*  4.3  4.1   CHLORIDE  107  103  103   CO2  25  26  29   GLUCOSE  86  100*  62*   BUN  22  24*  28*   CREATININE  1.25  1.47*  1.38   CALCIUM  8.6  8.9  9.6     Recent Labs      04/23/18   1104   APTT  30.9   INR  1.08                  Assessment/Plan     * Aneurysm of ascending aorta (CMS-HCC)- (present on admission)   Assessment & Plan    CTA chest with 5 cm x 4.9cm ascending aortic aneurysm, no dissection.  CTS consult pending.  BP control per cardiology metoprolol and hydralazine.        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        Hyperlipidemia- (present on admission)   Assessment & Plan    lipitor daily.        Hearing disorder- (present on admission)   Assessment & Plan    S/p cochlear implant.          Quality-Core Measures

## 2018-04-24 NOTE — ASSESSMENT & PLAN NOTE
- CTA chest with 5 cm x 4.9cm ascending aortic aneurysm, no dissection. Plan for elective repair of aortic aneurysm. Continue to optimize BP control with metoprolol. Continue Lipitor. Follow-up with CT surgery.

## 2018-04-24 NOTE — PROGRESS NOTES
Cardiology Progress Note               Author: RK Villanueva Date & Time created: 4/24/2018  7:38 AM     Interval History:  Honorio Kimbrough is a 67 year old male who presented to [facility] with complaints of progressive shortness of breath on 4/20/18. CT of the chest showed a 5 x 4.9 cm ascending aortic aneurysm. An echo was also performed showing moderate aortic stenosis, moderate to severe aortic insufficiency and severe mitral regurgitation. Patient was transferred here on 4/22/18 for surgical consultation.     Patient is slightly anxious about the possibility of surgery as his mother who is 92 and lives in [facility] also apparently has severe heart problems and has been told she has 6 months to live.     Past medical history significant for cochlear implant and hyperlipidemia.      4/23/18: Patient states that he is feeling well. Denies chest pain or palpitations. States that he has shortness of breath only with exertion.  4/24/18:Pt sitting in bed eating breakfast. He reports being able to tolerate ambulation to the restroom without SOB. He would like to travel to [facility] to take care of some family matters with his mother.     Monitor: SR 60-70s PACs    Labs:   RBC 4.16  Hgb 13.8  Hct 41.8    Bun 25  Cr 1.15  GFR >60    Echo 4/21/18:  Compare to prior echo from 2009 the mixed aortic valve disease has   significantly worsened and increase MR. Left ventricular ejection fraction is visually estimated to be 55%. Moderate aortic stenosis.  Transvalvular gradients are - Peak: 37 mmHg, Mean: 24 mmHg. Moderate to severe aortic insufficiency. Estimated right ventricular systolic pressure  is 59 mmHg. Moderate to severe mitral regurgitation.  Ascending aorta is dilated with a diameter of 4.6 cm.    Review of Systems   Constitutional: Negative for malaise/fatigue.   Respiratory: Positive for shortness of breath (Exertional). Negative for cough.    Cardiovascular: Negative for chest pain,  palpitations, orthopnea and leg swelling.   Gastrointestinal: Negative for nausea and vomiting.   Neurological: Negative for dizziness and headaches.   All other systems reviewed and are negative.    Physical Exam   Constitutional: He is oriented to person, place, and time. He appears well-developed and well-nourished. No distress.   HENT:   Head: Normocephalic and atraumatic.   Eyes: EOM are normal.   Cardiovascular: Normal rate and regular rhythm.    Murmur heard.   Systolic murmur is present with a grade of 3/6   Pulses:       Radial pulses are 2+ on the right side, and 2+ on the left side.        Dorsalis pedis pulses are 2+ on the right side, and 2+ on the left side.   Pulmonary/Chest: Effort normal and breath sounds normal.   Abdominal: Soft. Bowel sounds are normal.   Musculoskeletal: He exhibits no edema.   Neurological: He is alert and oriented to person, place, and time.   Skin: Skin is warm and dry.   Psychiatric: He has a normal mood and affect. His behavior is normal. Judgment and thought content normal.   Nursing note and vitals reviewed.    Hemodynamics:  Temp (24hrs), Av.7 °C (98.1 °F), Min:36.3 °C (97.3 °F), Max:37.1 °C (98.8 °F)  Temperature: 36.8 °C (98.3 °F)  Pulse  Av.1  Min: 68  Max: 91   Blood Pressure : 117/74     Respiratory:    Respiration: 16, Pulse Oximetry: 96 %     Work Of Breathing / Effort: Mild     Fluids:     Weight: 79.5 kg (175 lb 4.3 oz)  GI/Nutrition:  Orders Placed This Encounter   Procedures   • Diet Order     Standing Status:   Standing     Number of Occurrences:   1     Order Specific Question:   Diet:     Answer:   Regular [1]     Lab Results:  Recent Labs      18   0504  18   1104  18   0337   WBC  5.7  6.9  5.5   RBC  4.16*  4.62*  4.24*   HEMOGLOBIN  13.8*  15.5  14.3   HEMATOCRIT  41.8*  47.8  43.0   MCV  100.5*  103.5*  101.4*   MCH  33.2*  33.5*  33.7*   MCHC  33.0*  32.4*  33.3*   RDW  52.7*  55.8*  53.0*   PLATELETCT  197  216  207   MPV   11.2  10.9  11.2     Recent Labs      04/22/18   0504  04/23/18   1104  04/24/18   0337   SODIUM  136  138  138   POTASSIUM  4.3  4.1  4.3   CHLORIDE  103  103  105   CO2  26  29  26   GLUCOSE  100*  62*  106*   BUN  24*  28*  25*   CREATININE  1.47*  1.38  1.15   CALCIUM  8.9  9.6  8.8     Recent Labs      04/23/18   1104   APTT  30.9   INR  1.08     Medical Decision Making, by Problem:  Active Hospital Problems    Diagnosis   • SOB (shortness of breath) [R06.02]   • Valvular heart disease [I38]   • Aneurysm of ascending aorta (CMS-HCC) [I71.2]   • HEATHER (acute kidney injury) (CMS-[facility]) [N17.9]   • Hyperlipidemia [E78.5]     Plan:  Valvuar heart disease  -cont hydralazine and metoprolol  -schedule pt for cath if pt is a surgical candidate per CTS  -NPO at midnight    Aneurysm of ascending aorta    -5 x 4.9 cm on CT.     HLD   -Last LDL in 01/2017 was 162, .   -Atorvastatin 40 mg daily.     HEATHER   -Holding lasix. Primary following.   -improving    Will await CTS consultation and recommendations.      We will continue to follow alongside you in the care of this patient. Please let us know if you have any questions or concerns.    Quality-Core Measures   Reviewed items::  EKG reviewed, Labs reviewed, Medications reviewed and Radiology images reviewed  Gutierrez catheter::  No Gutierrez  DVT prophylaxis pharmacological::  Heparin  DVT prophylaxis - mechanical:  SCDs

## 2018-04-24 NOTE — PROGRESS NOTES
Assumed care at 0700, bedside report received from night shift RN. Pt is sr on the telemetry monitor. Patient is AO x 4 and is resting comfortably in bed with even, unlabored respirations eating breakfast. Initial assessment completed and orders reviewed. POC addressed with patient. Call light within reach and hourly rounding in place. No further questions at this time

## 2018-04-24 NOTE — ASSESSMENT & PLAN NOTE
- Plan for elective aortic valve replacement, mitral valve repair or replacement, ascending aortic aneurysm repair on May 18. Will continue to optimize blood pressure, with Lopressor. Follow-up with CT surgery.

## 2018-04-24 NOTE — CARE PLAN
Problem: Safety  Goal: Will remain free from injury  Outcome: PROGRESSING AS EXPECTED  Bed locked and in lowest position. Treaded socks on. Call light and belongings within reach. Patient educated to call for assistance. Patient verbalized understanding. Hourly rounding in place.     Problem: Knowledge Deficit  Goal: Knowledge of disease process/condition, treatment plan, diagnostic tests, and medications will improve  Outcome: PROGRESSING AS EXPECTED  Discussed POC and medications with patient, patient verbalized understanding.

## 2018-04-25 LAB
ANION GAP SERPL CALC-SCNC: 6 MMOL/L (ref 0–11.9)
BASOPHILS # BLD AUTO: 1.6 % (ref 0–1.8)
BASOPHILS # BLD: 0.09 K/UL (ref 0–0.12)
BUN SERPL-MCNC: 24 MG/DL (ref 8–22)
CALCIUM SERPL-MCNC: 9.1 MG/DL (ref 8.5–10.5)
CHLORIDE SERPL-SCNC: 103 MMOL/L (ref 96–112)
CO2 SERPL-SCNC: 27 MMOL/L (ref 20–33)
CREAT SERPL-MCNC: 1.19 MG/DL (ref 0.5–1.4)
EOSINOPHIL # BLD AUTO: 0.28 K/UL (ref 0–0.51)
EOSINOPHIL NFR BLD: 5 % (ref 0–6.9)
ERYTHROCYTE [DISTWIDTH] IN BLOOD BY AUTOMATED COUNT: 54.9 FL (ref 35.9–50)
GLUCOSE SERPL-MCNC: 89 MG/DL (ref 65–99)
HCT VFR BLD AUTO: 44 % (ref 42–52)
HGB BLD-MCNC: 14.5 G/DL (ref 14–18)
IMM GRANULOCYTES # BLD AUTO: 0.01 K/UL (ref 0–0.11)
IMM GRANULOCYTES NFR BLD AUTO: 0.2 % (ref 0–0.9)
LYMPHOCYTES # BLD AUTO: 1.97 K/UL (ref 1–4.8)
LYMPHOCYTES NFR BLD: 35.2 % (ref 22–41)
MCH RBC QN AUTO: 34.1 PG (ref 27–33)
MCHC RBC AUTO-ENTMCNC: 33 G/DL (ref 33.7–35.3)
MCV RBC AUTO: 103.5 FL (ref 81.4–97.8)
MONOCYTES # BLD AUTO: 0.61 K/UL (ref 0–0.85)
MONOCYTES NFR BLD AUTO: 10.9 % (ref 0–13.4)
MRSA DNA SPEC QL NAA+PROBE: NORMAL
NEUTROPHILS # BLD AUTO: 2.64 K/UL (ref 1.82–7.42)
NEUTROPHILS NFR BLD: 47.1 % (ref 44–72)
NRBC # BLD AUTO: 0 K/UL
NRBC BLD-RTO: 0 /100 WBC
PLATELET # BLD AUTO: 208 K/UL (ref 164–446)
PMV BLD AUTO: 10.7 FL (ref 9–12.9)
POTASSIUM SERPL-SCNC: 4.3 MMOL/L (ref 3.6–5.5)
RBC # BLD AUTO: 4.25 M/UL (ref 4.7–6.1)
SIGNIFICANT IND 70042: NORMAL
SITE SITE: NORMAL
SODIUM SERPL-SCNC: 136 MMOL/L (ref 135–145)
SOURCE SOURCE: NORMAL
WBC # BLD AUTO: 5.6 K/UL (ref 4.8–10.8)

## 2018-04-25 PROCEDURE — 700111 HCHG RX REV CODE 636 W/ 250 OVERRIDE (IP)

## 2018-04-25 PROCEDURE — C1887 CATHETER, GUIDING: HCPCS

## 2018-04-25 PROCEDURE — 99153 MOD SED SAME PHYS/QHP EA: CPT

## 2018-04-25 PROCEDURE — 700105 HCHG RX REV CODE 258: Performed by: INTERNAL MEDICINE

## 2018-04-25 PROCEDURE — 85025 COMPLETE CBC W/AUTO DIFF WBC: CPT

## 2018-04-25 PROCEDURE — A9270 NON-COVERED ITEM OR SERVICE: HCPCS | Performed by: INTERNAL MEDICINE

## 2018-04-25 PROCEDURE — B2151ZZ FLUOROSCOPY OF LEFT HEART USING LOW OSMOLAR CONTRAST: ICD-10-PCS | Performed by: INTERNAL MEDICINE

## 2018-04-25 PROCEDURE — C1769 GUIDE WIRE: HCPCS

## 2018-04-25 PROCEDURE — 700102 HCHG RX REV CODE 250 W/ 637 OVERRIDE(OP): Performed by: HOSPITALIST

## 2018-04-25 PROCEDURE — 99232 SBSQ HOSP IP/OBS MODERATE 35: CPT | Performed by: INTERNAL MEDICINE

## 2018-04-25 PROCEDURE — 360979 HCHG DIAGNOSTIC CATH

## 2018-04-25 PROCEDURE — 700117 HCHG RX CONTRAST REV CODE 255: Performed by: INTERNAL MEDICINE

## 2018-04-25 PROCEDURE — B3101ZZ FLUOROSCOPY OF THORACIC AORTA USING LOW OSMOLAR CONTRAST: ICD-10-PCS | Performed by: INTERNAL MEDICINE

## 2018-04-25 PROCEDURE — 307093 HCHG TR BAND RADIAL

## 2018-04-25 PROCEDURE — 93567 NJX CAR CTH SPRVLV AORTGRPHY: CPT

## 2018-04-25 PROCEDURE — 99152 MOD SED SAME PHYS/QHP 5/>YRS: CPT

## 2018-04-25 PROCEDURE — C1894 INTRO/SHEATH, NON-LASER: HCPCS

## 2018-04-25 PROCEDURE — 700102 HCHG RX REV CODE 250 W/ 637 OVERRIDE(OP): Performed by: INTERNAL MEDICINE

## 2018-04-25 PROCEDURE — 304952 HCHG R 2 PADS

## 2018-04-25 PROCEDURE — 80048 BASIC METABOLIC PNL TOTAL CA: CPT

## 2018-04-25 PROCEDURE — 4A023N7 MEASUREMENT OF CARDIAC SAMPLING AND PRESSURE, LEFT HEART, PERCUTANEOUS APPROACH: ICD-10-PCS | Performed by: INTERNAL MEDICINE

## 2018-04-25 PROCEDURE — 700111 HCHG RX REV CODE 636 W/ 250 OVERRIDE (IP): Performed by: HOSPITALIST

## 2018-04-25 PROCEDURE — 36415 COLL VENOUS BLD VENIPUNCTURE: CPT

## 2018-04-25 PROCEDURE — A9270 NON-COVERED ITEM OR SERVICE: HCPCS | Performed by: HOSPITALIST

## 2018-04-25 PROCEDURE — B2111ZZ FLUOROSCOPY OF MULTIPLE CORONARY ARTERIES USING LOW OSMOLAR CONTRAST: ICD-10-PCS | Performed by: INTERNAL MEDICINE

## 2018-04-25 PROCEDURE — 93458 L HRT ARTERY/VENTRICLE ANGIO: CPT

## 2018-04-25 PROCEDURE — 700101 HCHG RX REV CODE 250

## 2018-04-25 PROCEDURE — 770020 HCHG ROOM/CARE - TELE (206)

## 2018-04-25 RX ORDER — LIDOCAINE HYDROCHLORIDE 20 MG/ML
INJECTION, SOLUTION INFILTRATION; PERINEURAL
Status: COMPLETED
Start: 2018-04-25 | End: 2018-04-25

## 2018-04-25 RX ORDER — ONDANSETRON 2 MG/ML
4 INJECTION INTRAMUSCULAR; INTRAVENOUS EVERY 4 HOURS PRN
Status: DISCONTINUED | OUTPATIENT
Start: 2018-04-25 | End: 2018-04-26 | Stop reason: HOSPADM

## 2018-04-25 RX ORDER — HEPARIN SODIUM,PORCINE 1000/ML
VIAL (ML) INJECTION
Status: COMPLETED
Start: 2018-04-25 | End: 2018-04-25

## 2018-04-25 RX ORDER — MIDAZOLAM HYDROCHLORIDE 1 MG/ML
INJECTION INTRAMUSCULAR; INTRAVENOUS
Status: COMPLETED
Start: 2018-04-25 | End: 2018-04-25

## 2018-04-25 RX ORDER — SCOLOPAMINE TRANSDERMAL SYSTEM 1 MG/1
1 PATCH, EXTENDED RELEASE TRANSDERMAL
Status: DISCONTINUED | OUTPATIENT
Start: 2018-04-25 | End: 2018-04-26 | Stop reason: HOSPADM

## 2018-04-25 RX ORDER — FUROSEMIDE 10 MG/ML
INJECTION INTRAMUSCULAR; INTRAVENOUS
Status: COMPLETED
Start: 2018-04-25 | End: 2018-04-25

## 2018-04-25 RX ORDER — HALOPERIDOL 5 MG/ML
1 INJECTION INTRAMUSCULAR EVERY 6 HOURS PRN
Status: DISCONTINUED | OUTPATIENT
Start: 2018-04-25 | End: 2018-04-26 | Stop reason: HOSPADM

## 2018-04-25 RX ORDER — DIPHENHYDRAMINE HYDROCHLORIDE 50 MG/ML
25 INJECTION INTRAMUSCULAR; INTRAVENOUS EVERY 6 HOURS PRN
Status: DISCONTINUED | OUTPATIENT
Start: 2018-04-25 | End: 2018-04-26 | Stop reason: HOSPADM

## 2018-04-25 RX ORDER — VERAPAMIL HYDROCHLORIDE 2.5 MG/ML
INJECTION, SOLUTION INTRAVENOUS
Status: COMPLETED
Start: 2018-04-25 | End: 2018-04-25

## 2018-04-25 RX ORDER — DEXAMETHASONE SODIUM PHOSPHATE 4 MG/ML
4 INJECTION, SOLUTION INTRA-ARTICULAR; INTRALESIONAL; INTRAMUSCULAR; INTRAVENOUS; SOFT TISSUE
Status: DISCONTINUED | OUTPATIENT
Start: 2018-04-25 | End: 2018-04-26 | Stop reason: HOSPADM

## 2018-04-25 RX ADMIN — IOHEXOL 148 ML: 350 INJECTION, SOLUTION INTRAVENOUS at 11:37

## 2018-04-25 RX ADMIN — FENTANYL CITRATE 75 MCG: 50 INJECTION, SOLUTION INTRAMUSCULAR; INTRAVENOUS at 11:38

## 2018-04-25 RX ADMIN — METOPROLOL TARTRATE 25 MG: 25 TABLET, FILM COATED ORAL at 17:13

## 2018-04-25 RX ADMIN — HYDRALAZINE HYDROCHLORIDE 10 MG: 10 TABLET, FILM COATED ORAL at 05:46

## 2018-04-25 RX ADMIN — VERAPAMIL HYDROCHLORIDE 2.5 MG: 2.5 INJECTION, SOLUTION INTRAVENOUS at 11:07

## 2018-04-25 RX ADMIN — HEPARIN SODIUM 5000 UNITS: 5000 INJECTION, SOLUTION INTRAVENOUS; SUBCUTANEOUS at 20:33

## 2018-04-25 RX ADMIN — FUROSEMIDE 20 MG: 10 INJECTION, SOLUTION INTRAMUSCULAR; INTRAVENOUS at 11:37

## 2018-04-25 RX ADMIN — NITROGLYCERIN 10 ML: 20 INJECTION INTRAVENOUS at 11:07

## 2018-04-25 RX ADMIN — HEPARIN SODIUM: 1000 INJECTION, SOLUTION INTRAVENOUS; SUBCUTANEOUS at 11:07

## 2018-04-25 RX ADMIN — ATORVASTATIN CALCIUM 40 MG: 40 TABLET, FILM COATED ORAL at 17:13

## 2018-04-25 RX ADMIN — HEPARIN SODIUM 2000 UNITS: 200 INJECTION, SOLUTION INTRAVENOUS at 11:09

## 2018-04-25 RX ADMIN — METOPROLOL TARTRATE 25 MG: 25 TABLET, FILM COATED ORAL at 05:46

## 2018-04-25 RX ADMIN — HEPARIN SODIUM 5000 UNITS: 5000 INJECTION, SOLUTION INTRAVENOUS; SUBCUTANEOUS at 15:03

## 2018-04-25 RX ADMIN — SODIUM CHLORIDE, POTASSIUM CHLORIDE, SODIUM LACTATE AND CALCIUM CHLORIDE: 600; 310; 30; 20 INJECTION, SOLUTION INTRAVENOUS at 07:44

## 2018-04-25 RX ADMIN — MIDAZOLAM 1.5 MG: 1 INJECTION INTRAMUSCULAR; INTRAVENOUS at 11:37

## 2018-04-25 RX ADMIN — LIDOCAINE HYDROCHLORIDE: 20 INJECTION, SOLUTION INFILTRATION; PERINEURAL at 11:07

## 2018-04-25 ASSESSMENT — PAIN SCALES - GENERAL
PAINLEVEL_OUTOF10: 0

## 2018-04-25 NOTE — PROCEDURES
DATE OF SERVICE:  04/25/2018    PROCEDURES:  1.  Supervision of conscious sedation.  2.  Selective coronary angiography.  3.  Left heart catheterization.  4.  Left ventriculography.  5.  Ascending aortography.    INDICATIONS:  The patient is a 67-year-old gentleman with a history of both   mitral and aortic valve regurgitation.  He is undergoing angiography in   preparation for anticipated valve replacement surgery.    DESCRIPTION OF PROCEDURE:  The right wrist was sterilely prepped and draped in   the usual fashion and the area over the right renal artery was anesthetized   with 2% lidocaine.  Conscious sedation was obtained using Versed 1 mg and   fentanyl 50 mcg IV.  He received additional Versed and fentanyl during the   procedure.  Please refer to the nurses' notes for dosages and timing.    The area of the radial artery was anesthetized with 2% lidocaine.  The artery   was then easily entered.  A 6-Palestinian sheath was placed.  He was then given   4000 units of intravenous heparin as well as intra-arterial nitroglycerin and   verapamil.  A 5-Palestinian Omar catheter was placed and advanced into the ostium   of the left main coronary artery where selective angiograms were performed.    This catheter would not seat into the right coronary artery and was exchanged   for a right Roger catheter, which was advanced into the ostium of the right   coronary artery where selective angiograms were performed.  Following this, a   pigtail catheter was exchanged and advanced into the ascending aorta.  This   catheter does not easily cross the aortic valve and therefore ascending   aortogram was performed in the Hebrew position using 30 mL of contrast.    Following this, the aortic valve was crossed with the aid of a right Roger   catheter, and exchange wire was placed.  A pigtail catheter was then replaced   across the aortic valve and a left heart catheterization was performed.  This   was followed by left ventriculogram using  24 mL of contrast over 3 seconds.    Left heart pullback was performed.  The catheter was removed.  Additional   nitroglycerin was given through the sheath.  The sheath was then removed and   hemostasis was obtained by direct compression of the artery using Hemoband.    CONSCIOUS SEDATION:  Start time 11:03 a.m. and completion 11:30 a.m.    TOTAL CONTRAST MEDIA:  148 mL of Omnipaque 350.    ESTIMATED BLOOD LOSS:  Less than 10 mL    FLUOROSCOPY TIME:  5.0 minutes.    X-RAY DOSE-AREA PRODUCT:  12,735.    COMPLICATIONS:  There were no complications.    HEMODYNAMICS:  Revealed aortic pressure 135/65 mmHg.  LV pressure was 130/35   mmHg.    Fluoroscopy of the heart is unremarkable.    Left ventriculogram demonstrates global hypokinesis with ejection fraction   63%.  There is aneurysmal dilatation of the ascending aorta.  Mitral   insufficiency is present is at least 3+ in severity.  There was not adequate   visualization to determine if there was regurgitant flow into the pulmonary   veins.    The ascending aortogram demonstrates aortic insufficiency that is at least 3+   in severity.  There is aneurysmal dilatation of the ascending aorta.    The left main is free of disease and trifurcates into a large caliber ramus   intermedius, the LAD, and the circumflex.  The ramus intermedius is a large   vessel measuring almost 3 mm in diameter and is free of disease.    The circumflex is also a large vessel, measuring over 4 mm in diameter.  This   also gives rise to a small caliber first marginal artery and then a large   atrial branch.  Beyond this, the circumflex terminates in a large bifurcating   marginal artery.    The LAD supplies the apex and inferoapical segment of the left ventricle.    This is a large vessel giving rise to a large caliber first diagonal artery   and a moderate sized second diagonal artery.  The mid vessel arises a smaller   third diagonal artery.  In the mid LAD, there are sequential nonsignificant    stenoses of 30%.  The right coronary artery is a large caliber and dominant   vessel.  This also gives rise to a large RV free wall branch, a large caliber   and unremarkable PDA, and a moderate sized bifurcating posterior left   ventricular branch.    IMPRESSION:  1.  Nonobstructive coronary artery disease with sequential 30% eccentric   lesions in the mid left anterior descending.  2.  Moderately severe to severe aortic insufficiency.  3.  Eccentric aneurysmal dilatation of the ascending aorta.  4.  Mitral insufficiency graded at least 3+ in severity.  5.  Elevated left ventricular end diastolic pressure (the patient received 20   mg of intravenous furosemide in the cath lab, based on the hemodynamic   findings).       ____________________________________     AUDRA PAGAN MD    RPS / NTS    DD:  04/25/2018 13:11:51  DT:  04/25/2018 13:39:40    D#:  [id number]  Job#:  [id number]    cc: MIGUELANGEL MONTEMAYOR MD

## 2018-04-25 NOTE — PROGRESS NOTES
Dr. Sheth paged and updated on patient's BP 90/56 and has hydralazine due this evening with no parameters. Telephone order received to hold this dose of hydralazine.

## 2018-04-25 NOTE — CATH LAB
Immediate Post-Operative Note      PreOp Diagnosis: mitral and aortic valve disease    PostOp Diagnosis: same    Procedure(s) :  Coronary Angiography, Left Heart Catheterization, Left Ventriculography, ascending aortography    Surgeon(s):  Cornelius Abbott M.D.    Type of Anesthesia: Moderate Sedation    Specimen: None    Estimated Blood Loss: <10 cc's    Contrast Media:  148 cc's    Fluoro Time: 5.0 min    Xray DAP: 23906    Findings: 3+ or greater MR. 3+ AI, normal coros, reduced EF at 43%  Normal coros, LVEDP> 30    Complications: none      Cornelius Abbott M.D.  4/25/2018 11:35 AM

## 2018-04-25 NOTE — PROGRESS NOTES
Assumed care at 0700, bedside report received from night shift RN. Pt is SR on the telemetry monitor. Patient is AO x 4 and is resting comfortably at the edge of the bed with eyes open and even, unlabored respirations. Initial assessment completed and orders reviewed. POC addressed with patient. Call light within reach and hourly rounding in place. No further questions at this time

## 2018-04-25 NOTE — PROGRESS NOTES
1045 patient off floor to cath lab  1200 patient back to floor. Right radial site clean dry and intact. Hemaband in place CNS intact. VSS stable. Radial pulses 2+ bilaterally  1215 first 3 cc's of air removed from hemaband  1330 15cc's removed and hemaband deflated  1345 hemaband removed    Site is clean dry and intact. No bleeding and no hematoma. Radial pulses 2+ bilaterally

## 2018-04-26 ENCOUNTER — PATIENT OUTREACH (OUTPATIENT)
Dept: HEALTH INFORMATION MANAGEMENT | Facility: OTHER | Age: 68
End: 2018-04-26

## 2018-04-26 VITALS
HEIGHT: 72 IN | BODY MASS INDEX: 22.96 KG/M2 | TEMPERATURE: 97.1 F | SYSTOLIC BLOOD PRESSURE: 100 MMHG | HEART RATE: 63 BPM | OXYGEN SATURATION: 95 % | DIASTOLIC BLOOD PRESSURE: 60 MMHG | RESPIRATION RATE: 20 BRPM | WEIGHT: 169.53 LBS

## 2018-04-26 PROBLEM — N17.9 AKI (ACUTE KIDNEY INJURY) (HCC): Status: RESOLVED | Noted: 2018-04-20 | Resolved: 2018-04-26

## 2018-04-26 LAB
ANION GAP SERPL CALC-SCNC: 6 MMOL/L (ref 0–11.9)
BASOPHILS # BLD AUTO: 1.4 % (ref 0–1.8)
BASOPHILS # BLD: 0.08 K/UL (ref 0–0.12)
BUN SERPL-MCNC: 28 MG/DL (ref 8–22)
CALCIUM SERPL-MCNC: 9.3 MG/DL (ref 8.5–10.5)
CHLORIDE SERPL-SCNC: 104 MMOL/L (ref 96–112)
CO2 SERPL-SCNC: 28 MMOL/L (ref 20–33)
CREAT SERPL-MCNC: 1.3 MG/DL (ref 0.5–1.4)
EOSINOPHIL # BLD AUTO: 0.22 K/UL (ref 0–0.51)
EOSINOPHIL NFR BLD: 3.9 % (ref 0–6.9)
ERYTHROCYTE [DISTWIDTH] IN BLOOD BY AUTOMATED COUNT: 53 FL (ref 35.9–50)
GLUCOSE SERPL-MCNC: 90 MG/DL (ref 65–99)
HCT VFR BLD AUTO: 43.1 % (ref 42–52)
HGB BLD-MCNC: 14.4 G/DL (ref 14–18)
IMM GRANULOCYTES # BLD AUTO: 0.01 K/UL (ref 0–0.11)
IMM GRANULOCYTES NFR BLD AUTO: 0.2 % (ref 0–0.9)
LYMPHOCYTES # BLD AUTO: 1.4 K/UL (ref 1–4.8)
LYMPHOCYTES NFR BLD: 25 % (ref 22–41)
MCH RBC QN AUTO: 34 PG (ref 27–33)
MCHC RBC AUTO-ENTMCNC: 33.4 G/DL (ref 33.7–35.3)
MCV RBC AUTO: 101.9 FL (ref 81.4–97.8)
MONOCYTES # BLD AUTO: 0.6 K/UL (ref 0–0.85)
MONOCYTES NFR BLD AUTO: 10.7 % (ref 0–13.4)
NEUTROPHILS # BLD AUTO: 3.3 K/UL (ref 1.82–7.42)
NEUTROPHILS NFR BLD: 58.8 % (ref 44–72)
NRBC # BLD AUTO: 0 K/UL
NRBC BLD-RTO: 0 /100 WBC
PLATELET # BLD AUTO: 186 K/UL (ref 164–446)
PMV BLD AUTO: 11.2 FL (ref 9–12.9)
POTASSIUM SERPL-SCNC: 4.5 MMOL/L (ref 3.6–5.5)
RBC # BLD AUTO: 4.23 M/UL (ref 4.7–6.1)
SODIUM SERPL-SCNC: 138 MMOL/L (ref 135–145)
WBC # BLD AUTO: 5.6 K/UL (ref 4.8–10.8)

## 2018-04-26 PROCEDURE — 80048 BASIC METABOLIC PNL TOTAL CA: CPT

## 2018-04-26 PROCEDURE — A9270 NON-COVERED ITEM OR SERVICE: HCPCS | Performed by: INTERNAL MEDICINE

## 2018-04-26 PROCEDURE — 99239 HOSP IP/OBS DSCHRG MGMT >30: CPT | Performed by: INTERNAL MEDICINE

## 2018-04-26 PROCEDURE — 700102 HCHG RX REV CODE 250 W/ 637 OVERRIDE(OP): Performed by: INTERNAL MEDICINE

## 2018-04-26 PROCEDURE — 85025 COMPLETE CBC W/AUTO DIFF WBC: CPT

## 2018-04-26 PROCEDURE — 36415 COLL VENOUS BLD VENIPUNCTURE: CPT

## 2018-04-26 PROCEDURE — 700111 HCHG RX REV CODE 636 W/ 250 OVERRIDE (IP): Performed by: HOSPITALIST

## 2018-04-26 RX ORDER — POTASSIUM CHLORIDE 750 MG/1
10 TABLET, EXTENDED RELEASE ORAL DAILY
Qty: 30 TAB | Refills: 11 | Status: SHIPPED | OUTPATIENT
Start: 2018-04-26 | End: 2018-04-26

## 2018-04-26 RX ORDER — ATORVASTATIN CALCIUM 40 MG/1
40 TABLET, FILM COATED ORAL
Qty: 30 TAB | Refills: 3 | Status: SHIPPED | OUTPATIENT
Start: 2018-04-26 | End: 2018-06-07

## 2018-04-26 RX ORDER — FUROSEMIDE 20 MG/1
20 TABLET ORAL DAILY
Qty: 30 TAB | Refills: 11 | Status: SHIPPED | OUTPATIENT
Start: 2018-04-26 | End: 2018-06-07

## 2018-04-26 RX ORDER — FUROSEMIDE 20 MG/1
20 TABLET ORAL DAILY
Qty: 30 TAB | Refills: 11 | Status: SHIPPED | OUTPATIENT
Start: 2018-04-26 | End: 2018-04-26

## 2018-04-26 RX ORDER — ATORVASTATIN CALCIUM 40 MG/1
40 TABLET, FILM COATED ORAL
Qty: 30 TAB | Refills: 3 | Status: SHIPPED | OUTPATIENT
Start: 2018-04-26 | End: 2018-04-26

## 2018-04-26 RX ORDER — HYDRALAZINE HYDROCHLORIDE 10 MG/1
10 TABLET, FILM COATED ORAL EVERY 8 HOURS
Qty: 90 TAB | Refills: 2 | Status: SHIPPED | OUTPATIENT
Start: 2018-04-26 | End: 2018-04-26

## 2018-04-26 RX ORDER — POTASSIUM CHLORIDE 750 MG/1
10 TABLET, EXTENDED RELEASE ORAL DAILY
Qty: 30 TAB | Refills: 11 | Status: SHIPPED | OUTPATIENT
Start: 2018-04-26 | End: 2018-06-07

## 2018-04-26 RX ADMIN — HEPARIN SODIUM 5000 UNITS: 5000 INJECTION, SOLUTION INTRAVENOUS; SUBCUTANEOUS at 06:19

## 2018-04-26 RX ADMIN — METOPROLOL TARTRATE 25 MG: 25 TABLET, FILM COATED ORAL at 06:19

## 2018-04-26 RX ADMIN — HYDRALAZINE HYDROCHLORIDE 10 MG: 10 TABLET, FILM COATED ORAL at 06:19

## 2018-04-26 ASSESSMENT — ENCOUNTER SYMPTOMS
PALPITATIONS: 0
PND: 0
CLAUDICATION: 0
ORTHOPNEA: 0

## 2018-04-26 ASSESSMENT — PAIN SCALES - GENERAL
PAINLEVEL_OUTOF10: 0

## 2018-04-26 NOTE — PROGRESS NOTES
Cardiology Progress Note               Author: Antonio Liu Date & Time created: 2018  1:02 PM     Interval History:    -s/p cath 18 showed non obstructive CAD,severe AI, severe MR 3+, aneurysmal dilatation of the ascending aorta , elevated LVEDP >30    - NO rhythm issues over night       Chief Complaint:    -Severe valvular disease    -worsening dyspnea     -Ascending aorta 4.6 cm      /60  HR 60's NSR    Labs reviewed  Na, K, Cr stable         Review of Systems   Cardiovascular: Negative for chest pain, palpitations, orthopnea, claudication, leg swelling and PND.       Physical Exam   Constitutional: He appears well-developed.   HENT:   Head: Normocephalic.   Eyes: Conjunctivae are normal.   Neck: No thyromegaly present.   Cardiovascular: Normal rate and regular rhythm.    Murmur heard.   Systolic murmur is present   Pulses:       Carotid pulses are 2+ on the right side, and 2+ on the left side.       Radial pulses are 2+ on the right side, and 2+ on the left side.   Pulmonary/Chest: He has no wheezes.   Abdominal: Soft.   Musculoskeletal: He exhibits no edema.   Skin:   Cath site intact       Hemodynamics:  Temp (24hrs), Av.4 °C (97.6 °F), Min:36.2 °C (97.2 °F), Max:36.7 °C (98.1 °F)  Temperature: 36.7 °C (98.1 °F)  Pulse  Av.7  Min: 52  Max: 93   Blood Pressure : 117/62     Respiratory:    Respiration: 20, Pulse Oximetry: 95 %     Work Of Breathing / Effort: Mild  RUL Breath Sounds: Clear, RML Breath Sounds: Clear, RLL Breath Sounds: Clear, TAY Breath Sounds: Clear, LLL Breath Sounds: Clear  Fluids:     Weight: 76.9 kg (169 lb 8.5 oz)  GI/Nutrition:  Orders Placed This Encounter   Procedures   • Diet Order     Standing Status:   Standing     Number of Occurrences:   1     Order Specific Question:   Diet:     Answer:   Cardiac [6]     Lab Results:  Recent Labs      18   1100  18   0341  18   0349   WBC  5.9  5.6  5.6   RBC  4.51*  4.25*  4.23*   HEMOGLOBIN  15.2  14.5   14.4   HEMATOCRIT  46.0  44.0  43.1   MCV  102.0*  103.5*  101.9*   MCH  33.7*  34.1*  34.0*   MCHC  33.0*  33.0*  33.4*   RDW  54.2*  54.9*  53.0*   PLATELETCT  217  208  186   MPV  11.0  10.7  11.2     Recent Labs      04/24/18   1100  04/25/18   0341  04/26/18   0349   SODIUM  135  136  138   POTASSIUM  4.5  4.3  4.5   CHLORIDE  104  103  104   CO2  25  27  28   GLUCOSE  103*  89  90   BUN  26*  24*  28*   CREATININE  1.39  1.19  1.30   CALCIUM  9.4  9.1  9.3     Recent Labs      04/24/18   1100   INR  1.14*                     Medical Decision Making, by Problem:  Active Hospital Problems    Diagnosis   • *Aneurysm of ascending aorta (CMS-HCC) [I71.2]   • Valvular heart disease (bicuspid aortic valve with severe mitral regurgitation/insufficiency, severe AI, moderate AS) [I38]   • Hyperlipidemia [E78.5]   • Hearing disorder [H91.90]       Assessment/Plan:    - stable for DC    -Stop hydralazine    - Lasix 20 mg daily    - Appreciate CTS , plan for valve x2 surgery on 5/18/18    - follow up with our office , will arrange       Quality-Core Measures   Reviewed items::  Medications reviewed and Labs reviewed

## 2018-04-26 NOTE — DISCHARGE INSTRUCTIONS
Discharge Instructions    Discharged to home by car with friend. Discharged via walking, hospital escort: Refused.  Special equipment needed: Not Applicable    Be sure to schedule a follow-up appointment with your primary care doctor or any specialists as instructed.     Discharge Plan:   Pneumococcal Vaccine Administered/Refused: Given (See MAR)  Influenza Vaccine Indication: Patient Refuses    I understand that a diet low in cholesterol, fat, and sodium is recommended for good health. Unless I have been given specific instructions below for another diet, I accept this instruction as my diet prescription.   Other diet: Cardiac    Special Instructions: None    · Is patient discharged on Warfarin / Coumadin?   No     Depression / Suicide Risk    As you are discharged from this [facility] facility, it is important to learn how to keep safe from harming yourself.    Recognize the warning signs:  · Abrupt changes in personality, positive or negative- including increase in energy   · Giving away possessions  · Change in eating patterns- significant weight changes-  positive or negative  · Change in sleeping patterns- unable to sleep or sleeping all the time   · Unwillingness or inability to communicate  · Depression  · Unusual sadness, discouragement and loneliness  · Talk of wanting to die  · Neglect of personal appearance   · Rebelliousness- reckless behavior  · Withdrawal from people/activities they love  · Confusion- inability to concentrate     If you or a loved one observes any of these behaviors or has concerns about self-harm, here's what you can do:  · Talk about it- your feelings and reasons for harming yourself  · Remove any means that you might use to hurt yourself (examples: pills, rope, extension cords, firearm)  · Get professional help from the community (Mental Health, Substance Abuse, psychological counseling)  · Do not be alone:Call your Safe Contact- someone whom you trust who will be there for  you.  · Call your local CRISIS HOTLINE 028-9754 or 047-523-7873  · Call your local Children's Mobile Crisis Response Team [place] (089) 952-8891 or www.[address]  · Call the toll free National Suicide Prevention Hotlines   · National Suicide Prevention Lifeline 621-622-FKHA (0251)  · National Hope Line Network 800-SUICIDE (886-0615)        Angiogram, Care After  These instructions give you information about caring for yourself after your procedure. Your doctor may also give you more specific instructions. Call your doctor if you have any problems or questions after your procedure.   HOME CARE  · Take medicines only as told by your doctor.  · Follow your doctor's instructions about:  ¨ Care of the area where the tube was inserted.  ¨ Bandage (dressing) changes and removal.  · You may shower 24-48 hours after the procedure or as told by your doctor.  · Do not take baths, swim, or use a hot tub until your doctor approves.  · Every day, check the area where the tube was inserted. Watch for:  ¨ Redness, swelling, or pain.  ¨ Fluid, blood, or pus.  · Do not apply powder or lotion to the site.  · Do not lift anything that is heavier than 10 lb (4.5 kg) for 5 days or as told by your doctor.  · Ask your doctor when you can:  ¨ Return to work or school.  ¨ Do physical activities or play sports.  ¨ Have sex.  · Do not drive or operate heavy machinery for 24 hours or as told by your doctor.  · Have someone with you for the first 24 hours after the procedure.  · Keep all follow-up visits as told by your doctor. This is important.  GET HELP IF:  · You have a fever.    · You have chills.    · You have more bleeding from the area where the tube was inserted. Hold pressure on the area.  · You have redness, swelling, or pain in the area where the tube was inserted.  · You have fluid or pus coming from the area.  GET HELP RIGHT AWAY IF:   · You have a lot of pain in the area where the tube was inserted.  · The area where  the tube was inserted is bleeding, and the bleeding does not stop after 30 minutes of holding steady pressure on the area.  · The area near or just beyond the insertion site becomes pale, cool, tingly, or numb.     This information is not intended to replace advice given to you by your health care provider. Make sure you discuss any questions you have with your health care provider.     Document Released: 03/16/2010 Document Revised: 01/08/2016 Document Reviewed: 07/06/2015  [employer] Patient Education ©2016 [employer]    Furosemide tablets  What is this medicine?  FUROSEMIDE (fyoor [place] se mide) is a diuretic. It helps you make more urine and to lose salt and excess water from your body. This medicine is used to treat high blood pressure, and edema or swelling from heart, kidney, or liver disease.  This medicine may be used for other purposes; ask your health care provider or pharmacist if you have questions.  COMMON BRAND NAME(S): Active-Medicated Specimen Kit, Delone, Diuscreen, Lasix, RX Specimen Collection Kit, Specimen Collection Kit, URINX Medicated Specimen Collection  What should I tell my health care provider before I take this medicine?  They need to know if you have any of these conditions:  -abnormal blood electrolytes  -diarrhea or vomiting  -gout  -heart disease  -kidney disease, small amounts of urine, or difficulty passing urine  -liver disease  -thyroid disease  -an unusual or allergic reaction to furosemide, sulfa drugs, other medicines, foods, dyes, or preservatives  -pregnant or trying to get pregnant  -breast-feeding  How should I use this medicine?  Take this medicine by mouth with a glass of water. Follow the directions on the prescription label. You may take this medicine with or without food. If it upsets your stomach, take it with food or milk. Do not take your medicine more often than directed. Remember that you will need to pass more urine after taking this medicine. Do not  take your medicine at a time of day that will cause you problems. Do not take at bedtime.  Talk to your pediatrician regarding the use of this medicine in children. While this drug may be prescribed for selected conditions, precautions do apply.  Overdosage: If you think you have taken too much of this medicine contact a poison control center or emergency room at once.  NOTE: This medicine is only for you. Do not share this medicine with others.  What if I miss a dose?  If you miss a dose, take it as soon as you can. If it is almost time for your next dose, take only that dose. Do not take double or extra doses.  What may interact with this medicine?  -aspirin and aspirin-like medicines  -certain antibiotics  -chloral hydrate  -cisplatin  -cyclosporine  -digoxin  -diuretics  -laxatives  -lithium  -medicines for blood pressure  -medicines that relax muscles for surgery  -methotrexate  -NSAIDs, medicines for pain and inflammation like ibuprofen, naproxen, or indomethacin  -phenytoin  -steroid medicines like prednisone or cortisone  -sucralfate  -thyroid hormones  This list may not describe all possible interactions. Give your health care provider a list of all the medicines, herbs, non-prescription drugs, or dietary supplements you use. Also tell them if you smoke, drink alcohol, or use illegal drugs. Some items may interact with your medicine.  What should I watch for while using this medicine?  Visit your doctor or health care professional for regular checks on your progress. Check your blood pressure regularly. Ask your doctor or health care professional what your blood pressure should be, and when you should contact him or her. If you are a diabetic, check your blood sugar as directed.  You may need to be on a special diet while taking this medicine. Check with your doctor. Also, ask how many glasses of fluid you need to drink a day. You must not get dehydrated.  You may get drowsy or dizzy. Do not drive, use  machinery, or do anything that needs mental alertness until you know how this drug affects you. Do not stand or sit up quickly, especially if you are an older patient. This reduces the risk of dizzy or fainting spells. Alcohol can make you more drowsy and dizzy. Avoid alcoholic drinks.  This medicine can make you more sensitive to the sun. Keep out of the sun. If you cannot avoid being in the sun, wear protective clothing and use sunscreen. Do not use sun lamps or tanning beds/booths.  What side effects may I notice from receiving this medicine?  Side effects that you should report to your doctor or health care professional as soon as possible:  -blood in urine or stools  -dry mouth  -fever or chills  -hearing loss or ringing in the ears  -irregular heartbeat  -muscle pain or weakness, cramps  -skin rash  -stomach upset, pain, or nausea  -tingling or numbness in the hands or feet  -unusually weak or tired  -vomiting or diarrhea  -yellowing of the eyes or skin  Side effects that usually do not require medical attention (report to your doctor or health care professional if they continue or are bothersome):  -headache  -loss of appetite  -unusual bleeding or bruising  This list may not describe all possible side effects. Call your doctor for medical advice about side effects. You may report side effects to FDA at 9-310-FDA-8403.  Where should I keep my medicine?  Keep out of the reach of children.  Store at room temperature between 15 and 30 degrees C (59 and 86 degrees F). Protect from light. Throw away any unused medicine after the expiration date.  NOTE: This sheet is a summary. It may not cover all possible information. If you have questions about this medicine, talk to your doctor, pharmacist, or health care provider.  © 2018 [employer]vier/Gold Standard (2016-03-09 13:49:50)    Metoprolol tablets  What is this medicine?  METOPROLOL (me TOE proe lole) is a beta-blocker. Beta-blockers reduce the workload on the heart and  help it to beat more regularly. This medicine is used to treat high blood pressure and to prevent chest pain. It is also used to after a heart attack and to prevent an additional heart attack from occurring.  This medicine may be used for other purposes; ask your health care provider or pharmacist if you have questions.  COMMON BRAND NAME(S): Lopressor  What should I tell my health care provider before I take this medicine?  They need to know if you have any of these conditions:  -diabetes  -heart or vessel disease like slow heart rate, worsening heart failure, heart block, sick sinus syndrome or Raynaud's disease  -kidney disease  -liver disease  -lung or breathing disease, like asthma or emphysema  -pheochromocytoma  -thyroid disease  -an unusual or allergic reaction to metoprolol, other beta-blockers, medicines, foods, dyes, or preservatives  -pregnant or trying to get pregnant  -breast-feeding  How should I use this medicine?  Take this medicine by mouth with a drink of water. Follow the directions on the prescription label. Take this medicine immediately after meals. Take your doses at regular intervals. Do not take more medicine than directed. Do not stop taking this medicine suddenly. This could lead to serious heart-related effects.  Talk to your pediatrician regarding the use of this medicine in children. Special care may be needed.  Overdosage: If you think you have taken too much of this medicine contact a poison control center or emergency room at once.  NOTE: This medicine is only for you. Do not share this medicine with others.  What if I miss a dose?  If you miss a dose, take it as soon as you can. If it is almost time for your next dose, take only that dose. Do not take double or extra doses.  What may interact with this medicine?  This medicine may interact with the following medications:  -certain medicines for blood pressure, heart disease, irregular heart beat  -certain medicines for depression  like monoamine oxidase (MAO) inhibitors, fluoxetine, or paroxetine  -clonidine  -dobutamine  -epinephrine  -isoproterenol  -reserpine  This list may not describe all possible interactions. Give your health care provider a list of all the medicines, herbs, non-prescription drugs, or dietary supplements you use. Also tell them if you smoke, drink alcohol, or use illegal drugs. Some items may interact with your medicine.  What should I watch for while using this medicine?  Visit your doctor or health care professional for regular check ups. Contact your doctor right away if your symptoms worsen. Check your blood pressure and pulse rate regularly. Ask your health care professional what your blood pressure and pulse rate should be, and when you should contact them.  You may get drowsy or dizzy. Do not drive, use machinery, or do anything that needs mental alertness until you know how this medicine affects you. Do not sit or stand up quickly, especially if you are an older patient. This reduces the risk of dizzy or fainting spells. Contact your doctor if these symptoms continue. Alcohol may interfere with the effect of this medicine. Avoid alcoholic drinks.  What side effects may I notice from receiving this medicine?  Side effects that you should report to your doctor or health care professional as soon as possible:  -allergic reactions like skin rash, itching or hives  -cold or numb hands or feet  -depression  -difficulty breathing  -faint  -fever with sore throat  -irregular heartbeat, chest pain  -rapid weight gain  -swollen legs or ankles  Side effects that usually do not require medical attention (report to your doctor or health care professional if they continue or are bothersome):  -anxiety or nervousness  -change in sex drive or performance  -dry skin  -headache  -nightmares or trouble sleeping  -short term memory loss  -stomach upset or diarrhea  -unusually tired  This list may not describe all possible side  effects. Call your doctor for medical advice about side effects. You may report side effects to FDA at 6-680-BAA-1264.  Where should I keep my medicine?  Keep out of the reach of children.  Store at room temperature between 15 and 30 degrees C (59 and 86 degrees F). Throw away any unused medicine after the expiration date.  NOTE: This sheet is a summary. It may not cover all possible information. If you have questions about this medicine, talk to your doctor, pharmacist, or health care provider.  © 2018 [employer]Gold Standard (2014-08-22 14:40:36)    Atorvastatin tablets  What is this medicine?  ATORVASTATIN (a TORE va sta tin) is known as a HMG-CoA reductase inhibitor or 'statin'. It lowers the level of cholesterol and triglycerides in the blood. This drug may also reduce the risk of heart attack, stroke, or other health problems in patients with risk factors for heart disease. Diet and lifestyle changes are often used with this drug.  This medicine may be used for other purposes; ask your health care provider or pharmacist if you have questions.  COMMON BRAND NAME(S): Lipitor  What should I tell my health care provider before I take this medicine?  They need to know if you have any of these conditions:  -frequently drink alcoholic beverages  -history of stroke, TIA  -kidney disease  -liver disease  -muscle aches or weakness  -other medical condition  -an unusual or allergic reaction to atorvastatin, other medicines, foods, dyes, or preservatives  -pregnant or trying to get pregnant  -breast-feeding  How should I use this medicine?  Take this medicine by mouth with a glass of water. Follow the directions on the prescription label. You can take this medicine with or without food. Take your doses at regular intervals. Do not take your medicine more often than directed.  Talk to your pediatrician regarding the use of this medicine in children. While this drug may be prescribed for children as young as 10 years old for  selected conditions, precautions do apply.  Overdosage: If you think you have taken too much of this medicine contact a poison control center or emergency room at once.  NOTE: This medicine is only for you. Do not share this medicine with others.  What if I miss a dose?  If you miss a dose, take it as soon as you can. If it is almost time for your next dose, take only that dose. Do not take double or extra doses.  What may interact with this medicine?  Do not take this medicine with any of the following medications:  -red yeast rice  -telaprevir  -telithromycin  -voriconazole  This medicine may also interact with the following medications:  -alcohol  -antiviral medicines for HIV or AIDS  -boceprevir  -certain antibiotics like clarithromycin, erythromycin, troleandomycin  -certain medicines for cholesterol like fenofibrate or gemfibrozil  -cimetidine  -clarithromycin  -colchicine  -cyclosporine  -digoxin  -female hormones, like estrogens or progestins and birth control pills  -grapefruit juice  -medicines for fungal infections like fluconazole, itraconazole, ketoconazole  -niacin  -rifampin  -spironolactone  This list may not describe all possible interactions. Give your health care provider a list of all the medicines, herbs, non-prescription drugs, or dietary supplements you use. Also tell them if you smoke, drink alcohol, or use illegal drugs. Some items may interact with your medicine.  What should I watch for while using this medicine?  Visit your doctor or health care professional for regular check-ups. You may need regular tests to make sure your liver is working properly.  Tell your doctor or health care professional right away if you get any unexplained muscle pain, tenderness, or weakness, especially if you also have a fever and tiredness. Your doctor or health care professional may tell you to stop taking this medicine if you develop muscle problems. If your muscle problems do not go away after stopping  this medicine, contact your health care professional.  This drug is only part of a total heart-health program. Your doctor or a dietician can suggest a low-cholesterol and low-fat diet to help. Avoid alcohol and smoking, and keep a proper exercise schedule.  Do not use this drug if you are pregnant or breast-feeding. Serious side effects to an unborn child or to an infant are possible. Talk to your doctor or pharmacist for more information.  This medicine may affect blood sugar levels. If you have diabetes, check with your doctor or health care professional before you change your diet or the dose of your diabetic medicine.  If you are going to have surgery tell your health care professional that you are taking this drug.  What side effects may I notice from receiving this medicine?  Side effects that you should report to your doctor or health care professional as soon as possible:  -allergic reactions like skin rash, itching or hives, swelling of the face, lips, or tongue  -dark urine  -fever  -joint pain  -muscle cramps, pain  -redness, blistering, peeling or loosening of the skin, including inside the mouth  -trouble passing urine or change in the amount of urine  -unusually weak or tired  -yellowing of eyes or skin  Side effects that usually do not require medical attention (report to your doctor or health care professional if they continue or are bothersome):  -constipation  -heartburn  -stomach gas, pain, upset  This list may not describe all possible side effects. Call your doctor for medical advice about side effects. You may report side effects to FDA at 9-182-FDA-0335.  Where should I keep my medicine?  Keep out of the reach of children.  Store at room temperature between 20 to 25 degrees C (68 to 77 degrees F). Throw away any unused medicine after the expiration date.  NOTE: This sheet is a summary. It may not cover all possible information. If you have questions about this medicine, talk to your doctor,  pharmacist, or health care provider.  © 2018 [employer]/Gold Standard (2012-11-06 09:18:24)

## 2018-04-26 NOTE — PROGRESS NOTES
Pt discharged to home, Rx changed from [facility] to [facility] on [address], pt is aware of change, all questions answered, belongings all with patient, IV discontinued, catheter tip intact

## 2018-04-26 NOTE — PROGRESS NOTES
Received report and care assumed. Patient A&Ox 4. Pt. Reports no pain. Work of breathing even and unlabored on room air. Discussed POC. Call light within reach and pt. instructed to call when in need of assistance.  Denies any other needs at this time.

## 2018-04-26 NOTE — CARE PLAN
Problem: Communication  Goal: The ability to communicate needs accurately and effectively will improve  Outcome: PROGRESSING AS EXPECTED  Call light in reach, updated on POC, verbalizes understanding    Problem: Respiratory:  Goal: Respiratory status will improve  Outcome: PROGRESSING AS EXPECTED  O2 sats above 92% on RA

## 2018-04-26 NOTE — DISCHARGE SUMMARY
HOSPITAL MEDICINE DISCHARGE SUMMARY    Name: Honorio Kimbrough  MRN: [id number]  : 1950  Admit Date: 2018  Discharge Date: 2018  Attending Provider: Morris Stewart M.D.  Admitting Provider: Jerardo Ford M.D.  Primary Care Physician: Wayne Rios D.O.    DISCHARGE DIAGNOSES:   Principal Problem:    Aneurysm of ascending aorta (CMS-[facility]) POA: Yes  Active Problems:    Valvular heart disease (bicuspid aortic valve with severe mitral regurgitation/insufficiency, severe AI, moderate AS) POA: Yes    Hearing disorder (Chronic) POA: Yes    Hyperlipidemia POA: Yes  Resolved Problems:    HEATHER (acute kidney injury) (CMS-[facility]) POA: Yes      SUMMARY OF EVENTS LEADING TO ADMISSION:   67 y.o. male admitted 2018 with worsening dyspnea on exertion. Initially seen at [facility], and was ruled out for MI.     For further details of history of present illness, past medical/social/family histories, allergies and medication, please refer to copy of admission note by Dr. Jerardo Ford M.D.    HOSPITAL COURSE:   The patient was admitted to the hospitalist service. CT of the chest did not show PE, but showed 5 cm ascending aneurysm without dissection. Echocardiogram showed normal EF, with bicuspid aortic valve with moderate to severe mitral regurgitation, moderate AS. A nuclear cardiac stress test was negative. Cardiology was consulted, and recommended CT surgery consultation, and left heart cath. His LDL was 162, and total cholesterol of 230, and he was started on Lipitor. He was also started on metoprolol and hydralazine, to optimize his blood pressure. He had acute kidney injury, which improved with IV fluids.    He was seen by CT surgery, and recommended elective aortic valve replacement, mitral valve repair or replacement, ascending aortic aneurysm repair, and intraoperative transesophageal echocardiography, and scheduled this on May 18, 2018. He underwent cardiac  catheterization, which showed nonobstructive coronary artery disease, with sequential 30% eccentric lesions in the mid left anterior descending, moderately severe to severe aortic insufficiency, with eccentric aneurysmal dilation of the ascending aorta, severe mitral insufficiency.      He remained hemodynamically stable and afebrile. Cardiology has good him for discharge. With  his clinical improvement, he was deemed ready to be discharged from the hospital as he did not have any further inpatient needs. Patient felt comfortable going home. The discharge plan was discussed with the patient, and he was agreeable to it.     The patient was subsequently sent home in improved and stable condition.     DISCHARGE DISPOSITION: recovered as expected.     FOLLOW-UP ISSUES:   * Aneurysm of ascending aorta (CMS-[facility])- (present on admission)   Assessment & Plan    - CTA chest with 5 cm x 4.9cm ascending aortic aneurysm, no dissection. Plan for elective repair of aortic aneurysm. Continue to optimize BP control with metoprolol. Continue Lipitor. Follow-up with CT surgery.        Valvular heart disease (bicuspid aortic valve with severe mitral regurgitation/insufficiency, severe AI, moderate AS)- (present on admission)   Assessment & Plan    - Plan for elective aortic valve replacement, mitral valve repair or replacement, ascending aortic aneurysm repair on May 18. Will continue to optimize blood pressure, with Lopressor. Follow-up with CT surgery.        HEATHER (acute kidney injury) (CMS-[facility])- (present on admission)   Assessment & Plan    - Improved. He will continue with oral hydration at home. Continue outpatient monitoring of his kidney function. Continue to avoid nephrotoxins, and continue to renally dose all medications.        Hyperlipidemia- (present on admission)   Assessment & Plan    - Continue Lipitor.               CHANGES IN MANAGEMENT OF CHRONIC CONDITION: As above.     PENDING TESTS: none    FOLLOW-UP TESTS ORDERED  POST DISCHARGE: none    DISCHARGE MEDICATIONS:   Prior to Admission medications    Medication Sig Start Date End Date Taking? Authorizing Provider   atorvastatin (LIPITOR) 40 MG Tab Take 1 Tab by mouth every bedtime. 4/26/18  Yes Morris Stewart M.D.   metoprolol (LOPRESSOR) 25 MG Tab Take 1 Tab by mouth 2 Times a Day. 4/26/18  Yes Morris Stewart M.D.          FOLLOW-UP APPOINTMENTS:   Wayne Rios D.O.  [address]  [place] [id number] [phone]    Schedule an appointment as soon as possible for a visit in 1 week  Hospital follow-up    Lucien Ferguson M.D.  [id number]  [place] [address]  [phone]    Schedule an appointment as soon as possible for a visit  Hospital follow-up, surgical planning        For further details on discharge medications, patient education, diet, and activity, please refer to electronic copy of discharge instructions.       TIME SPENT: I spent 43 minutes, with greater than 50% of the time spent on face-to-face encounter, addressing medical issues, coordination of care, counseling, discharge planning, medication reconciliation, and documentation.

## 2018-04-26 NOTE — CARE PLAN
Problem: Infection  Goal: Will remain free from infection  Outcome: PROGRESSING AS EXPECTED  Patient will verbalize signs and symptoms of infection, interventions that can prevent infection, and when to notify a healthcare provider regarding the signs and symptoms of infection.     Problem: Knowledge Deficit  Goal: Knowledge of disease process/condition, treatment plan, diagnostic tests, and medications will improve  Outcome: PROGRESSING AS EXPECTED  Knowledge of disease process, current condition, plan of care, medications, and diagnostics will improve.

## 2018-06-04 NOTE — PROGRESS NOTES
Chief Complaint   Patient presents with   • Shortness of Breath       Subjective:   Honorio Kimbrough is a 68 y.o. male who presents today for hospital following up after being admitted on 4/22/18 with increasing exertional shortness of breath. An angiogram was performed on 4/25/15 showing non-obstructive CAD, severe AI, severe MR, aneurysmal dilatation of the ascending aorta of 4.6 cm and elevated LVEDP >30.      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    Today patient states that they are feeling well. Denies chest pain, palpitations or lower extremity edema. Complains of some shortness of breath with exertion.     Patient states that he is not been taking any of his medications because he doesn't know what they are for and he doesn't think that he needs them.    MVR/AVR and surgical repair of his ascending aorta was sheduled to be completed on 5/18/18, however the patient states that his mother passed away and he was unable to make it. Patient would now like to wait until he can relocate to [place] next month where he has friends and family prior to having any open heart surgery performed.     Past Medical History:   Diagnosis Date   • Cholesterol serum elevated     diet and exercise controlled   • Enlarged tonsils     removed   • Hearing disorder 5/20/2009     Past Surgical History:   Procedure Laterality Date   • OTHER      coclear implant left ear 1/06   • OTHER ORTHOPEDIC SURGERY      traumatic amputation left middle finger     Family History   Problem Relation Age of Onset   • Diabetes Mother    • Hypertension Mother    • Other Father      Multiple Scleorsis      Social History     Social History   • Marital status: Single     Spouse name: N/A   • Number of children: N/A   • Years of education: N/A     Occupational History   • Not on file.     Social History Main Topics   • Smoking status: Never Smoker   • Smokeless  tobacco: Never Used   • Alcohol use Yes      Comment: occasional / very light   • Drug use: No   • Sexual activity: Not on file     Other Topics Concern   • Not on file     Social History Narrative   • No narrative on file     No Known Allergies  Outpatient Encounter Prescriptions as of 6/7/2018   Medication Sig Dispense Refill   • rosuvastatin (CRESTOR) 10 MG Tab Take 1 Tab by mouth every evening. 90 Tab 3   • metoprolol (LOPRESSOR) 25 MG Tab Take 1 Tab by mouth 2 Times a Day. 180 Tab 3   • [DISCONTINUED] atorvastatin (LIPITOR) 40 MG Tab Take 1 Tab by mouth every bedtime. 30 Tab 3   • [DISCONTINUED] metoprolol (LOPRESSOR) 25 MG Tab Take 1 Tab by mouth 2 Times a Day. 60 Tab 2   • [DISCONTINUED] furosemide (LASIX) 20 MG Tab Take 1 Tab by mouth every day. 30 Tab 11   • [DISCONTINUED] potassium chloride SA (K-DUR) 10 MEQ Tab CR Take 1 Tab by mouth every day. 30 Tab 11     No facility-administered encounter medications on file as of 6/7/2018.      Review of Systems   Constitutional: Negative for malaise/fatigue and weight loss.   Respiratory: Positive for shortness of breath (Some with exertion).    Cardiovascular: Negative for chest pain, palpitations, orthopnea, claudication, leg swelling and PND.   Gastrointestinal: Negative for abdominal pain.   Musculoskeletal: Negative for joint pain and myalgias.   Neurological: Negative for dizziness and weakness.   All other systems reviewed and are negative.       Objective:   /78   Pulse 84   Resp 16   Ht 1.829 m (6')   Wt 81.6 kg (179 lb 14.4 oz)   SpO2 97%   BMI 24.40 kg/m²     Physical Exam   Constitutional: He is oriented to person, place, and time. He appears well-developed and well-nourished. No distress.   HENT:   Head: Normocephalic.   Hearing aid present on left ear.    Eyes: EOM are normal.   Neck: No JVD present.   Cardiovascular: Normal rate and regular rhythm.    Murmur heard.   Systolic murmur is present with a grade of 3/6   Pulses:       Radial  pulses are 2+ on the right side, and 2+ on the left side.        Dorsalis pedis pulses are 2+ on the right side, and 2+ on the left side.   Pulmonary/Chest: Effort normal and breath sounds normal.   Abdominal: Soft. Bowel sounds are normal.   Musculoskeletal: He exhibits no edema.   Neurological: He is alert and oriented to person, place, and time.   Skin: Skin is warm and dry.   Psychiatric: He has a normal mood and affect. His behavior is normal.          BMP 4/25/18:  Component Value Ref Range & Units Status   Sodium 138  135 - 145 mmol/L Final   Potassium 4.5  3.6 - 5.5 mmol/L Final   Chloride 104  96 - 112 mmol/L Final   Co2 28  20 - 33 mmol/L Final   Glucose 90  65 - 99 mg/dL Final   Bun 28   8 - 22 mg/dL Final   Creatinine 1.30  0.50 - 1.40 mg/dL Final   Calcium 9.3  8.5 - 10.5 mg/dL Final   Anion Gap 6.0  0.0 - 11.9 Final     GFR If African American >60  >60 mL/min/1.73 m 2 Final   GFR If Non African American >60  >60 mL/min/1.73 m 2 Final     Echocardiogram 4/21/18:  Compare to prior echo from 2009 the mixed aortic valve disease has   significantly worsened and increase MR. Left ventricular ejection fraction is visually estimated to be 55%. Moderate aortic stenosis.Transvalvular gradients are - Peak: 37 mmHg, Mean: 24 mmHg. Moderate to severe aortic insufficiency.  Estimated right ventricular systolic pressure  is 59 mmHg. Moderate to severe mitral regurgitation. Ascending aorta is dilated with a diameter of 4.6 cm.    Angiogram 4/25/18:  PROCEDURES:  1.  Supervision of conscious sedation.  2.  Selective coronary angiography.  3.  Left heart catheterization.  4.  Left ventriculography.  5.  Ascending aortography.    IMPRESSION:  1.  Nonobstructive coronary artery disease with sequential 30% eccentric   lesions in the mid left anterior descending.  2.  Moderately severe to severe aortic insufficiency.  3.  Eccentric aneurysmal dilatation of the ascending aorta.  4.  Mitral insufficiency graded at least 3+ in  severity.  5.  Elevated left ventricular end diastolic pressure (the patient received 20   mg of intravenous furosemide in the cath lab, based on the hemodynamic   findings).    Assessment:     1. Essential hypertension, benign  BASIC METABOLIC PANEL   2. Aneurysm of ascending aorta (HCC)     3. Valvular heart disease (bicuspid aortic valve with severe mitral regurgitation/insufficiency, severe AI, moderate AS)     4. Hyperlipidemia, unspecified hyperlipidemia type         Medical Decision Making:  Today's Assessment / Status / Plan:     Valvular Heart Disease with Dilation of Ascending aorta (4.6 cm):   -Plan is for aortic valve replacement, mitral valve repair/replacement and ascending aortic aneurysm repair. Patient would like to have this done in [place], [place].  -Appears euvolemic. Discontinued lasix and potassium as patient refuses to take it.  -Thoroughly discussed with patient the importance of BP control with his aneurysm and valvular disease. Patient has agreed to take his BB.     HTN:   -Slightly elevated today.   -Metoprolol 25 mg twice a daily  -Repeat BMP    HLD:  -Last LDL on 1/10/17 was 162  -Patient states that he is unable to tolerate the atorvastatin.   -Crestor 10 mg daily.      Thorough discussion with patient regarding his mitral/aortic insufficiency, aortic aneurysm and disease process. Patient has seen CTS here in town, however would like to wait to have surgery until her can relocate to [facility] where he has more support.     Discussed with patient that he needs to check his weight daily and call for weight gain of more than 3 lbs/day or 5 lbs/month. Also discussed that is he starts to experience lower extremity swelling or shortness of breath that he needs to call. Patient verbalized understanding.      Patient will follow up in one month with BMP prior or earlier if needed. Patient understands and agrees with the plan of care. Encouraged patient to call if he has any questions or  concerns.     Collaborating Provider: Dr. Dmitriy Flores.

## 2018-06-07 ENCOUNTER — OFFICE VISIT (OUTPATIENT)
Dept: CARDIOLOGY | Facility: MEDICAL CENTER | Age: 68
End: 2018-06-07

## 2018-06-07 VITALS
HEART RATE: 84 BPM | WEIGHT: 179.9 LBS | OXYGEN SATURATION: 97 % | RESPIRATION RATE: 16 BRPM | DIASTOLIC BLOOD PRESSURE: 78 MMHG | HEIGHT: 72 IN | SYSTOLIC BLOOD PRESSURE: 148 MMHG | BODY MASS INDEX: 24.37 KG/M2

## 2018-06-07 DIAGNOSIS — E78.5 HYPERLIPIDEMIA, UNSPECIFIED HYPERLIPIDEMIA TYPE: ICD-10-CM

## 2018-06-07 DIAGNOSIS — I71.21 ANEURYSM OF ASCENDING AORTA (HCC): ICD-10-CM

## 2018-06-07 DIAGNOSIS — I10 ESSENTIAL HYPERTENSION, BENIGN: ICD-10-CM

## 2018-06-07 DIAGNOSIS — I38 VALVULAR HEART DISEASE: ICD-10-CM

## 2018-06-07 PROBLEM — D53.9 MACROCYTIC ANEMIA: Status: RESOLVED | Noted: 2018-04-20 | Resolved: 2018-06-07

## 2018-06-07 PROBLEM — R06.83 SNORING: Status: RESOLVED | Noted: 2017-01-09 | Resolved: 2018-06-07

## 2018-06-07 PROBLEM — N17.9 ARF (ACUTE RENAL FAILURE) (HCC): Status: RESOLVED | Noted: 2018-04-22 | Resolved: 2018-06-07

## 2018-06-07 PROBLEM — R51.9 NONINTRACTABLE EPISODIC HEADACHE: Status: RESOLVED | Noted: 2017-01-09 | Resolved: 2018-06-07

## 2018-06-07 PROCEDURE — 99214 OFFICE O/P EST MOD 30 MIN: CPT | Performed by: NURSE PRACTITIONER

## 2018-06-07 RX ORDER — ROSUVASTATIN CALCIUM 10 MG/1
10 TABLET, COATED ORAL EVERY EVENING
Qty: 90 TAB | Refills: 3 | Status: SHIPPED | OUTPATIENT
Start: 2018-06-07 | End: 2018-07-19 | Stop reason: SDUPTHER

## 2018-06-07 ASSESSMENT — ENCOUNTER SYMPTOMS
PND: 0
MYALGIAS: 0
ABDOMINAL PAIN: 0
ORTHOPNEA: 0
PALPITATIONS: 0
DIZZINESS: 0
SHORTNESS OF BREATH: 1
CLAUDICATION: 0
WEIGHT LOSS: 0
WEAKNESS: 0

## 2018-07-02 ENCOUNTER — OFFICE VISIT (OUTPATIENT)
Dept: CARDIOLOGY | Facility: MEDICAL CENTER | Age: 68
End: 2018-07-02

## 2018-07-02 VITALS
OXYGEN SATURATION: 97 % | DIASTOLIC BLOOD PRESSURE: 68 MMHG | SYSTOLIC BLOOD PRESSURE: 136 MMHG | WEIGHT: 180.8 LBS | HEART RATE: 80 BPM | BODY MASS INDEX: 24.49 KG/M2 | HEIGHT: 72 IN

## 2018-07-02 DIAGNOSIS — E78.2 MIXED HYPERLIPIDEMIA: ICD-10-CM

## 2018-07-02 DIAGNOSIS — I71.21 ANEURYSM OF ASCENDING AORTA (HCC): ICD-10-CM

## 2018-07-02 DIAGNOSIS — I10 ESSENTIAL HYPERTENSION, BENIGN: ICD-10-CM

## 2018-07-02 DIAGNOSIS — I38 VALVULAR HEART DISEASE: ICD-10-CM

## 2018-07-02 PROBLEM — Z96.21 COCHLEAR IMPLANT IN PLACE: Status: ACTIVE | Noted: 2018-07-02

## 2018-07-02 PROCEDURE — 99214 OFFICE O/P EST MOD 30 MIN: CPT | Performed by: NURSE PRACTITIONER

## 2018-07-02 ASSESSMENT — ENCOUNTER SYMPTOMS
CLAUDICATION: 0
SHORTNESS OF BREATH: 0
COUGH: 0
ABDOMINAL PAIN: 0
MYALGIAS: 0
ORTHOPNEA: 0
PALPITATIONS: 0
DIZZINESS: 0
PND: 0
WEAKNESS: 0

## 2018-07-02 NOTE — PROGRESS NOTES
Chief Complaint   Patient presents with   • Evaluation Of Medication Change       Subjective:   Honorio Kimbrough is a 68 y.o. male who presents today for one-month follow-up on valvular heart disease.  He has a bicuspid aortic valve and mitral regurgitation.  He is in need of valve replacement and repair but wants to wait till he relocates to [place] where he has more support.  He does not need coronary artery bypass.    He has a cochlear implant therefore has some difficulty hearing.  However he is a reasonable historian.    He denies any chest discomfort, palpitations or significant shortness of breath.  He is able to ride a bicycle which he did a few days ago and tolerated it well.  He has not had any near syncope.    He tells me he is been off all his medications for over a month, more like 2 months.  He could not afford them as his mother passed and he did not have any money.    Previously, he took 1 dose of atorvastatin and had some kind of visual disturbance therefore felt he did not tolerate it and discontinued it.  He was prescribed Crestor but has not taken it yet.    Past Medical History:   Diagnosis Date   • Cholesterol serum elevated     diet and exercise controlled   • Enlarged tonsils     removed   • Hearing disorder 5/20/2009     Past Surgical History:   Procedure Laterality Date   • OTHER      coclear implant left ear 1/06   • OTHER ORTHOPEDIC SURGERY      traumatic amputation left middle finger     Family History   Problem Relation Age of Onset   • Diabetes Mother    • Hypertension Mother    • Other Father      Multiple Scleorsis      Social History     Social History   • Marital status: Single     Spouse name: N/A   • Number of children: N/A   • Years of education: N/A     Occupational History   • Not on file.     Social History Main Topics   • Smoking status: Never Smoker   • Smokeless tobacco: Never Used   • Alcohol use Yes      Comment: occasional / very light   • Drug use: No   • Sexual activity:  Not on file     Other Topics Concern   • Not on file     Social History Narrative   • No narrative on file     No Known Allergies  Outpatient Encounter Prescriptions as of 7/2/2018   Medication Sig Dispense Refill   • rosuvastatin (CRESTOR) 10 MG Tab Take 1 Tab by mouth every evening. 90 Tab 3   • metoprolol (LOPRESSOR) 25 MG Tab Take 1 Tab by mouth 2 Times a Day. 180 Tab 3     No facility-administered encounter medications on file as of 7/2/2018.      Review of Systems   Constitutional: Negative for malaise/fatigue.   HENT: Positive for hearing loss (Has a cochlear implant).    Respiratory: Negative for cough and shortness of breath.    Cardiovascular: Negative for chest pain, palpitations, orthopnea, claudication, leg swelling and PND.   Gastrointestinal: Negative for abdominal pain.   Musculoskeletal: Negative for myalgias.   Neurological: Negative for dizziness and weakness.        Objective:   /68   Pulse 80   Ht 1.829 m (6')   Wt 82 kg (180 lb 12.8 oz)   SpO2 97%   BMI 24.52 kg/m²     Physical Exam   Constitutional: He is oriented to person, place, and time. He appears well-developed and well-nourished.   HENT:   Head: Normocephalic.   Eyes: EOM are normal.   Neck: Normal range of motion. No JVD present.   Cardiovascular: Normal rate and regular rhythm.  Exam reveals no friction rub.    Murmur heard.   Systolic murmur is present with a grade of 3/6    Diastolic murmur is present with a grade of 1/6   Pulmonary/Chest: Effort normal.   Abdominal: Soft. Bowel sounds are normal.   Musculoskeletal: He exhibits no edema.   Neurological: He is alert and oriented to person, place, and time.   Skin: Skin is warm and dry.   Psychiatric: He has a normal mood and affect.     Results for FABIOLA WEBER (MRN [id number]) as of 7/2/2018 08:37   Ref. Range 1/10/2017 09:03   Cholesterol,Tot Latest Ref Range: 100 - 199 mg/dL 230 (H)   Triglycerides Latest Ref Range: 0 - 149 mg/dL 91   HDL Latest Ref Range: >=40 mg/dL 50    LDL Latest Ref Range: <100 mg/dL 162 (H)     Assessment:     1. Valvular heart disease (bicuspid aortic valve with severe mitral regurgitation/insufficiency, severe AI, moderate AS)     2. Aneurysm of ascending aorta (HCC)     3. Mixed hyperlipidemia     4. Essential hypertension, benign         Medical Decision Making:  Today's Assessment / Status / Plan:   Valvular heart disease: Appears stable at this time.  He is able to ride a bicycle and has not had any near syncope.  He is planning on moving to [place] in August  And will establish care there in order to have his surgery done.    He has been off metoprolol for over a month.  Also he tells me he was on some kind of water pill but has also been off that for an extended period of time.  At this time I will leave him off these medications.  If he does develop shortness of breath or edema he will let us know and we can restart medications.    Hyperlipidemia: His LDL is very elevated at 162.  He feels he did not tolerate Lipitor after 1 dose to to visual disturbances.  He did not  Crestor as it was very expensive.  He has an 80% discount card which he is hoping will help with the Crestor.  I did not order labs since he will establish with other providers in [place] who can order it after he has been taking a statin.    Hypertension: His blood pressure is good in the office today.  We will have him remain off diuretic and beta-blocker.    He will follow-up with us as needed prior to relocating.  I have encouraged him to establish quickly with somebody in [place] when he moves there.  However he is always welcome to use our services.    Collaborating Provider:  Dr. Dmitriy Flores.

## 2018-07-19 DIAGNOSIS — E78.2 MIXED HYPERLIPIDEMIA: Primary | ICD-10-CM

## 2018-07-19 RX ORDER — ROSUVASTATIN CALCIUM 10 MG/1
10 TABLET, COATED ORAL EVERY EVENING
Qty: 90 TAB | Refills: 0 | Status: SHIPPED | OUTPATIENT
Start: 2018-07-19 | End: 2018-12-03 | Stop reason: SDUPTHER

## 2018-07-21 NOTE — ASSESSMENT & PLAN NOTE
2/2 valvular heart disease.   BB therapy  Stop diuresis given evolving renal failure now.   Trend on tele  Surgery consulttion  Cards consultation       glasses

## 2018-07-31 ENCOUNTER — OFFICE VISIT (OUTPATIENT)
Dept: MEDICAL GROUP | Facility: CLINIC | Age: 68
End: 2018-07-31

## 2018-07-31 VITALS
HEART RATE: 84 BPM | WEIGHT: 184 LBS | HEIGHT: 72 IN | RESPIRATION RATE: 16 BRPM | SYSTOLIC BLOOD PRESSURE: 128 MMHG | TEMPERATURE: 98.1 F | OXYGEN SATURATION: 97 % | DIASTOLIC BLOOD PRESSURE: 74 MMHG | BODY MASS INDEX: 24.92 KG/M2

## 2018-07-31 DIAGNOSIS — H91.90 HEARING DISORDER, UNSPECIFIED LATERALITY: Chronic | ICD-10-CM

## 2018-07-31 DIAGNOSIS — I38 VALVULAR HEART DISEASE: ICD-10-CM

## 2018-07-31 DIAGNOSIS — E78.2 MIXED HYPERLIPIDEMIA: ICD-10-CM

## 2018-07-31 DIAGNOSIS — Z96.21 COCHLEAR IMPLANT IN PLACE: ICD-10-CM

## 2018-07-31 PROCEDURE — 99214 OFFICE O/P EST MOD 30 MIN: CPT | Performed by: INTERNAL MEDICINE

## 2018-07-31 NOTE — PROGRESS NOTES
Patient is planning on moving to [place] to see Ron lal MD a cardiologist in [place]. I have given Honorio kimbrough his cardiovascular surgery consult from Dr. Greer.     CC: Honorio Kimbrough is a 68 y.o. male is suffering from   Chief Complaint   Patient presents with   • Referral Needed     referral for heart specialist in [place], [place]         SUBJECTIVE:  1. Valvular heart disease (bicuspid aortic valve with severe mitral regurgitation/insufficiency, severe AI, moderate AS)  Honorio is here for follow-up, is planning to move to [facility] where he can have help, he needs aortic and mitral valve surgery. Patient is planning to see Dr. Ron Lal a cardiologist. Patient's consult from Dr. Talbot a cardiovascular surgeon was given to the patient.    2. Hearing disorder, unspecified laterality  Patient has a hearing disorder has a cochlear implant a referral has been written to [facility].     3. Cochlear implant in place  Patient with a cochlear implant clinically stable    4. Mixed hyperlipidemia  Patient will mixed dyslipidemia, no change in medical therapy        Past social, family, history: Single  Social History   Substance Use Topics   • Smoking status: Never Smoker   • Smokeless tobacco: Never Used   • Alcohol use Yes      Comment: occasional / very light         MEDICATIONS:    Current Outpatient Prescriptions:   •  rosuvastatin (CRESTOR) 10 MG Tab, Take 1 Tab by mouth every evening., Disp: 90 Tab, Rfl: 0  •  metoprolol (LOPRESSOR) 25 MG Tab, Take 1 Tab by mouth 2 Times a Day., Disp: 180 Tab, Rfl: 3    PROBLEMS:  Patient Active Problem List    Diagnosis Date Noted   • Essential hypertension, benign 06/07/2018     Priority: High   • Valvular heart disease (bicuspid aortic valve with severe mitral regurgitation/insufficiency, severe AI, moderate AS) 04/22/2018     Priority: High   • Aneurysm of ascending aorta (HCC) 04/20/2018     Priority: High   • Mixed hyperlipidemia 01/17/2017     Priority:  Low   • Hearing disorder 05/20/2009     Priority: Low   • Cochlear implant in place 07/02/2018       REVIEW OF SYSTEMS:  Gen.:  No Nausea, Vomiting, fever, Chills.  Heart: No chest pain.  Lungs:  No shortness of Breath.  Psychological: [place] unusual Anxiety depression     PHYSICAL EXAM   Constitutional: Alert, cooperative, not in acute distress.  Cardiovascular:  Rate Rhythm is regular with a harsh 4/6 systolic murmur right second interspace A diastolic murmur left sixth interspace.     Thorax & Lungs: Clear to auscultation, no wheezing, rhonchi, or rales  HENT: Normocephalic, Atraumatic.  Eyes: PERRLA, EOMI, Conjunctiva normal.   Neck: Trachia is midline no swelling of the thyroid.   Lymphatic: No lymphadenopathy noted.   Musculoskeletal: No evidence of lower extremity edema  Neurologic: Alert & oriented x 3, cranial nerves II through XII are intact, Normal motor function, Normal sensory function, No focal deficits noted.   Psychiatric: Affect normal, Judgment normal, Mood normal.     VITAL SIGNS:/74   Pulse 84   Temp 36.7 °C (98.1 °F)   Resp 16   Ht 1.829 m (6')   Wt 83.5 kg (184 lb)   SpO2 97%   BMI 24.95 kg/m²     Labs: Reviewed    Assessment:                                                     Plan:    1. Valvular heart disease (bicuspid aortic valve with severe mitral regurgitation/insufficiency, severe AI, moderate AS)  Valvular heart disease which is extensive patient is moving to [place] to be evaluated by Dr. Lal.  - BTYPE NATRIURETIC PEPTIDE; Future  - CBC WITH DIFFERENTIAL; Future  - COMP METABOLIC PANEL; Future    2. Hearing disorder, unspecified laterality  Referral written  - REFERRAL TO AUDIOLOGY    3. Cochlear implant in place  Clinically stable  - REFERRAL TO AUDIOLOGY    4. Mixed hyperlipidemia  Recheck cholesterol  - LIPID PROFILE; Future

## 2018-12-03 ENCOUNTER — OFFICE VISIT (OUTPATIENT)
Dept: MEDICAL GROUP | Facility: LAB | Age: 68
End: 2018-12-03

## 2018-12-03 VITALS
WEIGHT: 184 LBS | OXYGEN SATURATION: 97 % | SYSTOLIC BLOOD PRESSURE: 118 MMHG | TEMPERATURE: 96.7 F | RESPIRATION RATE: 16 BRPM | HEART RATE: 82 BPM | HEIGHT: 72 IN | BODY MASS INDEX: 24.92 KG/M2 | DIASTOLIC BLOOD PRESSURE: 78 MMHG

## 2018-12-03 DIAGNOSIS — H91.90 HEARING DISORDER, UNSPECIFIED LATERALITY: Chronic | ICD-10-CM

## 2018-12-03 DIAGNOSIS — E78.2 MIXED HYPERLIPIDEMIA: ICD-10-CM

## 2018-12-03 DIAGNOSIS — R06.02 SOB (SHORTNESS OF BREATH): ICD-10-CM

## 2018-12-03 DIAGNOSIS — I38 VALVULAR HEART DISEASE: ICD-10-CM

## 2018-12-03 PROCEDURE — 99214 OFFICE O/P EST MOD 30 MIN: CPT | Performed by: INTERNAL MEDICINE

## 2018-12-03 RX ORDER — ROSUVASTATIN CALCIUM 10 MG/1
10 TABLET, COATED ORAL EVERY EVENING
Qty: 90 TAB | Refills: 3 | Status: SHIPPED | OUTPATIENT
Start: 2018-12-03

## 2018-12-03 NOTE — PROGRESS NOTES
CC: Honorio Kimbrough is a 68 y.o. male is suffering from   Chief Complaint   Patient presents with   • Follow-Up         SUBJECTIVE:  1. Mixed hyperlipidemia  Honorio is here for follow-up, has a history of mixed dyslipidemia.  Patient states that he is having problems with exertional shortness of breath.    2. Valvular heart disease (bicuspid aortic valve with severe mitral regurgitation/insufficiency, severe AI, moderate AS)  In talking with the patient patient has significant problems with severe mitral regurgitation, I have explained to the patient that he is at risk for significant progression of his disease process and needs to be urgently evaluated by cardiovascular medicine.  Patient states that he is thinking about moving to [place] I have suggested he consider being seen here.    3. Hearing disorder, unspecified laterality  Patient with a significant hearing disorder, no change in medical therapy does use hearing aids    4. SOB (shortness of breath)  Shortness of breath likely due to moderate aortic stenosis with severe mitral regurgitation.  Recommended immediate evaluation possible surgery.        Past social, family, history: Single  Social History   Substance Use Topics   • Smoking status: Never Smoker   • Smokeless tobacco: Never Used   • Alcohol use Yes      Comment: occasional / very light         MEDICATIONS:    Current Outpatient Prescriptions:   •  rosuvastatin (CRESTOR) 10 MG Tab, Take 1 Tab by mouth every evening., Disp: 90 Tab, Rfl: 3  •  metoprolol (LOPRESSOR) 25 MG Tab, Take 1 Tab by mouth 2 Times a Day. (Patient not taking: Reported on 12/3/2018), Disp: 180 Tab, Rfl: 3    PROBLEMS:  Patient Active Problem List    Diagnosis Date Noted   • Essential hypertension, benign 06/07/2018     Priority: High   • Valvular heart disease (bicuspid aortic valve with severe mitral regurgitation/insufficiency, severe AI, moderate AS) 04/22/2018     Priority: High   • Aneurysm of ascending aorta (HCC) 04/20/2018      Priority: High   • Mixed hyperlipidemia 01/17/2017     Priority: Low   • Hearing disorder 05/20/2009     Priority: Low   • Cochlear implant in place 07/02/2018       REVIEW OF SYSTEMS:  Gen.:  No Nausea, Vomiting, fever, Chills.  Heart: No chest pain.  Lungs:  No shortness of Breath.  Psychological: Hernando unusual Anxiety depression     PHYSICAL EXAM   Constitutional: Alert, cooperative, not in acute distress.  Cardiovascular:  Rate Rhythm is regular without murmurs rubs clicks.     Thorax & Lungs: Clear to auscultation, no wheezing, rhonchi, or rales  HENT: Normocephalic, Atraumatic.  Eyes: PERRLA, EOMI, Conjunctiva normal.   Neck: Trachia is midline no swelling of the thyroid.   Lymphatic: No lymphadenopathy noted.   Neurologic: Alert & oriented x 3, cranial nerves II through XII are intact, Normal motor function, Normal sensory function, No focal deficits noted.   Psychiatric: Affect normal, Judgment normal, Mood normal.     VITAL SIGNS:/78 (BP Location: Right arm, Patient Position: Sitting, BP Cuff Size: Adult)   Pulse 82   Temp 35.9 °C (96.7 °F) (Temporal)   Resp 16   Ht 1.829 m (6')   Wt 83.5 kg (184 lb)   SpO2 97%   BMI 24.95 kg/m²     Labs: Reviewed    Assessment:                                                     Plan:    1. Mixed hyperlipidemia  Continue Crestor  - rosuvastatin (CRESTOR) 10 MG Tab; Take 1 Tab by mouth every evening.  Dispense: 90 Tab; Refill: 3    2. Valvular heart disease (bicuspid aortic valve with severe mitral regurgitation/insufficiency, severe AI, moderate AS)  Urgent referral written to cardiology  - REFERRAL TO CARDIOLOGY    3. Hearing disorder, unspecified laterality  No change in medical therapy    4. SOB (shortness of breath)  Suspect symptomatic worsening of his underlying aortic stenosis with severe mitral regurgitation.  - REFERRAL TO CARDIOLOGY

## 2018-12-08 ENCOUNTER — HOSPITAL ENCOUNTER (EMERGENCY)
Dept: HOSPITAL 8 - ED | Age: 68
Discharge: HOME | End: 2018-12-08
Payer: MEDICARE

## 2018-12-08 VITALS — WEIGHT: 182.76 LBS | BODY MASS INDEX: 24.75 KG/M2 | HEIGHT: 72 IN

## 2018-12-08 VITALS — DIASTOLIC BLOOD PRESSURE: 79 MMHG | SYSTOLIC BLOOD PRESSURE: 143 MMHG

## 2018-12-08 DIAGNOSIS — R06.00: Primary | ICD-10-CM

## 2018-12-08 DIAGNOSIS — R00.2: ICD-10-CM

## 2018-12-08 LAB
ALBUMIN SERPL-MCNC: 3.6 G/DL (ref 3.4–5)
ALP SERPL-CCNC: 58 U/L (ref 45–117)
ALT SERPL-CCNC: 29 U/L (ref 12–78)
ANION GAP SERPL CALC-SCNC: 7 MMOL/L (ref 5–15)
BASOPHILS # BLD AUTO: 0.02 X10^3/UL (ref 0–0.1)
BASOPHILS NFR BLD AUTO: 0 % (ref 0–1)
BILIRUB SERPL-MCNC: 0.7 MG/DL (ref 0.2–1)
CALCIUM SERPL-MCNC: 8.7 MG/DL (ref 8.5–10.1)
CHLORIDE SERPL-SCNC: 109 MMOL/L (ref 98–107)
CREAT SERPL-MCNC: 1.37 MG/DL (ref 0.7–1.3)
EOSINOPHIL # BLD AUTO: 0.14 X10^3/UL (ref 0–0.4)
EOSINOPHIL NFR BLD AUTO: 3 % (ref 1–7)
ERYTHROCYTE [DISTWIDTH] IN BLOOD BY AUTOMATED COUNT: 13.5 % (ref 9.4–14.8)
LYMPHOCYTES # BLD AUTO: 1.24 X10^3/UL (ref 1–3.4)
LYMPHOCYTES NFR BLD AUTO: 26 % (ref 22–44)
MCH RBC QN AUTO: 34.2 PG (ref 27.5–34.5)
MCHC RBC AUTO-ENTMCNC: 33 G/DL (ref 33.2–36.2)
MCV RBC AUTO: 103.6 FL (ref 81–97)
MD: NO
MONOCYTES # BLD AUTO: 0.6 X10^3/UL (ref 0.2–0.8)
MONOCYTES NFR BLD AUTO: 12 % (ref 2–9)
NEUTROPHILS # BLD AUTO: 2.87 X10^3/UL (ref 1.8–6.8)
NEUTROPHILS NFR BLD AUTO: 59 % (ref 42–75)
PLATELET # BLD AUTO: 174 X10^3/UL (ref 130–400)
PMV BLD AUTO: 9.7 FL (ref 7.4–10.4)
PROT SERPL-MCNC: 7 G/DL (ref 6.4–8.2)
RBC # BLD AUTO: 4.03 X10^6/UL (ref 4.38–5.82)
TROPONIN I SERPL-MCNC: 0.04 NG/ML (ref 0–0.04)

## 2018-12-08 PROCEDURE — 80053 COMPREHEN METABOLIC PANEL: CPT

## 2018-12-08 PROCEDURE — 85025 COMPLETE CBC W/AUTO DIFF WBC: CPT

## 2018-12-08 PROCEDURE — 93005 ELECTROCARDIOGRAM TRACING: CPT

## 2018-12-08 PROCEDURE — 83605 ASSAY OF LACTIC ACID: CPT

## 2018-12-08 PROCEDURE — 71045 X-RAY EXAM CHEST 1 VIEW: CPT

## 2018-12-08 PROCEDURE — 85379 FIBRIN DEGRADATION QUANT: CPT

## 2018-12-08 PROCEDURE — 36415 COLL VENOUS BLD VENIPUNCTURE: CPT

## 2018-12-08 PROCEDURE — 84484 ASSAY OF TROPONIN QUANT: CPT

## 2018-12-08 PROCEDURE — 99284 EMERGENCY DEPT VISIT MOD MDM: CPT

## 2020-03-06 NOTE — PROGRESS NOTES
Tele strip at 0726 shows SR w/ HR of 72  Measurements: .16/.08/.34    Tele Shift Summary:  Rhythm: SR  Rate: 60's-70's  Ectopy: Per CCT Lilli, pt had frequent PVC's.    Telemetry monitoring strips placed in pt chart.     0 = independent

## 2021-03-03 DIAGNOSIS — Z23 NEED FOR VACCINATION: ICD-10-CM

## 2021-07-27 NOTE — ED AVS SNAPSHOT
After Visit Summary                                                                                                                Honorio Kimbrough   MRN: [id number]    Department:  [facility], Emergency Dept   Date of Visit:  1/7/2017            [facility], Emergency Dept    [address]    [place] [address]    Phone:  [phone]      You were seen by     Lluvia Oquendo M.D.      Your Diagnosis Was     Acute nonintractable headache, unspecified headache type     R51       These are the medications you received during your hospitalization from 01/07/2017 1410 to 01/07/2017 1759     Date/Time Order Dose Route Action    01/07/2017 1747 acetaminophen/caffeine/butalbital 325-40-50 mg (FIORICET) -40 MG per tablet 1 Tab 1 Tab Oral Given      Follow-up Information     1. Follow up with [facility], Emergency Dept.    Specialty:  Emergency Medicine    Why:  As needed, If symptoms worsen    Contact information    [address] [place] [address] [phone]        2. Follow up with Wayne Rios D.O.. Call in 2 days.    Specialty:  Internal Medicine    Contact information    [address] [place] [address] [address]  [place] [address] [phone]          3. Follow up with Kristy Lagunas M.D.. Schedule an appointment as soon as possible for a visit in 2 days.    Specialty:  Family Medicine    Contact information    [address] [place] [address] [address]  [address]  [place] [address] [phone]        Medication Information     Review all of your home medications and newly ordered medications with your primary doctor and/or pharmacist as soon as possible. Follow medication instructions as directed by your doctor and/or pharmacist.     Please keep your complete medication list with you and share with your physician. Update the information when medications are discontinued, doses are changed, or new medications (including over-the-counter products) are  Received request via: Pharmacy    Was the patient seen in the last year in this department? Yes    Does the patient have an active prescription (recently filled or refills available) for medication(s) requested? No   added; and carry medication information at all times in the event of emergency situations.               Medication List      START taking these medications        Instructions    acetaminophen/caffeine/butalbital 300-40-50 mg -40 MG Caps capsule   Commonly known as:  FIORICET    Take 1-2 Caps by mouth every 6 hours as needed for Headache.   Dose:  1-2 Cap               Procedures and tests performed during your visit     CT-HEAD W/O        Discharge Instructions       Return to the ER for fever, increased pain, vomiting, weakness, numbness, or other concerns  Follow-up with Dr. Rios or establish care with Dr. Lagunas from family medicine--call them on Monday to set up an appointment for Monday or Tuesday  Take Fioricet as prescribed and as needed for headache      General Headache Without Cause  A headache is pain or discomfort felt around the head or neck area. The specific cause of a headache may not be found. There are many causes and types of headaches. A few common ones are:  · Tension headaches.  · Migraine headaches.  · Cluster headaches.  · Chronic daily headaches.  HOME CARE INSTRUCTIONS   · Keep all follow-up appointments with your health care provider or any specialist referral.  · Only take over-the-counter or prescription medicines for pain or discomfort as directed by your health care provider.  · Lie down in a dark, quiet room when you have a headache.  · Keep a headache journal to find out what may trigger your migraine headaches. For example, write down:  ¨ What you eat and drink.  ¨ How much sleep you get.  ¨ Any change to your diet or medicines.  · Try massage or other relaxation techniques.  · Put ice packs or heat on the head and neck. Use these 3 to 4 times per day for 15 to 20 minutes each time, or as needed.  · Limit stress.  · Sit up straight, and do not tense your muscles.  · Quit smoking if you smoke.  · Limit alcohol use.  · Decrease the amount of caffeine you drink, or stop  drinking caffeine.  · Eat and sleep on a regular schedule.  · Get 7 to 9 hours of sleep, or as recommended by your health care provider.  · Keep lights dim if bright lights bother you and make your headaches worse.  SEEK MEDICAL CARE IF:   · You have problems with the medicines you were prescribed.  · Your medicines are not working.  · You have a change from the usual headache.  · You have nausea or vomiting.  SEEK IMMEDIATE MEDICAL CARE IF:   · Your headache becomes severe.  · You have a fever.  · You have a stiff neck.  · You have loss of vision.  · You have muscular weakness or loss of muscle control.  · You start losing your balance or have trouble walking.  · You feel faint or pass out.  · You have severe symptoms that are different from your first symptoms.     This information is not intended to replace advice given to you by your health care provider. Make sure you discuss any questions you have with your health care provider.     Document Released: 12/18/2006 Document Revised: 05/03/2016 Document Reviewed: 01/02/2013  [employer] Interactive Patient Education ©2016 [employer]            Patient Information     Patient Information    Following emergency treatment: all patient requiring follow-up care must return either to a private physician or a clinic if your condition worsens before you are able to obtain further medical attention, please return to the emergency room.     Billing Information    At [facility], we work to make the billing process streamlined for our patients.  Our Representatives are here to answer any questions you may have regarding your hospital bill.  If you have insurance coverage and have supplied your insurance information to us, we will submit a claim to your insurer on your behalf.  Should you have any questions regarding your bill, we can be reached online or by phone as follows:  Online: You are able pay your bills online or live chat with our representatives about any billing  questions you may have. We are here to help Monday - Friday from 8:00am to 7:30pm and 9:00am - 12:00pm on Saturdays.  Please visit [email or web address]  for more information.   Phone:  [phone] or [phone]    Please note that your emergency physician, surgeon, pathologist, radiologist, anesthesiologist, and other specialists are not employed by [facility] and will therefore bill separately for their services.  Please contact them directly for any questions concerning their bills at the numbers below:     Emergency Physician Services:  [phone]  [facility]:  [phone]  [facility]:  [phone]  [facility]:  [phone]    1. Your final bill may vary from the amount quoted upon discharge if all procedures are not complete at that time, or if your doctor has additional procedures of which we are not aware. You will receive an additional bill if you return to the Emergency Department at [facility] for suture removal regardless of the facility of which the sutures were placed.     2. Please arrange for settlement of this account at the emergency registration.    3. All self-pay accounts are due in full at the time of treatment.  If you are unable to meet this obligation then payment is expected within 4-5 days.     4. If you have had radiology studies (CT, X-ray, Ultrasound, MRI), you have received a preliminary result during your emergency department visit. Please contact the radiology department [phone] to receive a copy of your final result. Please discuss the Final result with your primary physician or with the follow up physician provided.     Crisis Hotline:  National Crisis Hotline:  9-943-WWXLNLU or 1-288.784.5621  [place] Crisis Hotline:    1-769.859.8081 or 012-550-1946         ED Discharge Follow Up Questions    1. In order to provide you with very good care, we would like to follow up with a phone call  in the next few days.  May we have your permission to contact you?     YES /  NO    2. What is the best phone number to call you? (       )_____-__________    3. What is the best time to call you?      Morning  /  Afternoon  /  Evening                   Patient Signature:  ____________________________________________________________    Date:  ____________________________________________________________

## 2023-05-25 NOTE — PROGRESS NOTES
[facility] Hospitalist Progress Note    Date of Service: 2018    Chief Complaint  67 y.o. male admitted 2018 with worsening dyspnea on exertion. Initially seen at [facility], and was ruled out for MI. CT of the chest did not show PE, but showed 5 cm ascending aneurysm without dissection. Echocardiogram showed normal EF, with bicuspid aortic valve with moderate to severe mitral regurgitation, moderate AS. A nuclear cardiac stress test was negative. Cardiology was consulted, and recommended CT surgery consultation, and left heart cath. , total cholesterol of 230. Started on metoprolol and hydralazine, and Lipitor. Also has acute kidney injury. Started on IV fluids.    Interval Problem Update  2018 - uneventful night. VSS. Afebrile. Saturating well on RA. No leukocytosis. Hemoglobin stable. BMP remained unimpressive, with improvement in his creatinine to 1.19 back to normal. Carotid Dopplers ultrasound was obtained and was normal. Seen by CT surgery, and recommended aortic valve replacement, mitral valve repair or replacement, ascending aortic aneurysm repair, and intraoperative transesophageal echocardiography, and scheduled on May 18, 2018. s/p LHC, which showed nonobstructive coronary artery disease, with sequential 30% eccentric lesions in the mid left anterior descending, moderately severe to severe aortic insufficiency, with eccentric aneurysmal dilation of the ascending aorta, severe mitral insufficiency.     > Seen and examined. No complaints. No CP, SOB, nausea, vomiting, abd pain. In good spirits.       Consultants/Specialty  Cardiology/Dr. Hoyt  CTS     Disposition  Monitor on telemetry.      ROS    Pertinent positives/negatives as mentioned above.     A complete review of systems was done. All other systems were negative.      Physical Exam  Laboratory/Imaging   Hemodynamics  Temp (24hrs), Av.8 °C (98.3 °F), Min:36.4 °C (97.5 °F), Max:37.3 °C (99.2 °F)   Temperature: 36.4  °C (97.5 °F)  Pulse  Av.8  Min: 52  Max: 91    Blood Pressure : 111/51      Respiratory      Respiration: 16, Pulse Oximetry: 96 %     Work Of Breathing / Effort: Mild       Fluids    Intake/Output Summary (Last 24 hours) at 18 0753  Last data filed at 18 1800   Gross per 24 hour   Intake              800 ml   Output                0 ml   Net              800 ml       Nutrition  Orders Placed This Encounter   Procedures   • Diet NPO after Midnight     Standing Status:   Standing     Number of Occurrences:   8     Order Specific Question:   Restrict to:     Answer:   Sips with Medications [3]     Physical Exam   Constitutional: He is oriented to person, place, and time. He appears well-developed. No distress.   Hard of hearing   HENT:   Head: Normocephalic and atraumatic.   Mouth/Throat: Oropharynx is clear and moist. No oropharyngeal exudate.   Eyes: EOM are normal. Pupils are equal, round, and reactive to light. Right eye exhibits no discharge. Left eye exhibits no discharge. No scleral icterus.   Neck: Normal range of motion. Neck supple. No thyromegaly present.   Cardiovascular: Normal rate and regular rhythm.  Exam reveals no gallop and no friction rub.    Murmur heard.  Pulmonary/Chest: Effort normal and breath sounds normal. He has no wheezes. He has no rales. He exhibits no tenderness.   Abdominal: Soft. Bowel sounds are normal. There is no tenderness. There is no rebound and no guarding.   Musculoskeletal: Normal range of motion. He exhibits no edema or tenderness.   Lymphadenopathy:     He has no cervical adenopathy.   Neurological: He is alert and oriented to person, place, and time. No cranial nerve deficit. Coordination normal.   Skin: Skin is warm and dry. No rash noted. He is not diaphoretic. No erythema.   Psychiatric: He has a normal mood and affect. His behavior is normal. Thought content normal.   Vitals reviewed.      Recent Labs      18   0337  18   1100  18    0341   WBC  5.5  5.9  5.6   RBC  4.24*  4.51*  4.25*   HEMOGLOBIN  14.3  15.2  14.5   HEMATOCRIT  43.0  46.0  44.0   MCV  101.4*  102.0*  103.5*   MCH  33.7*  33.7*  34.1*   MCHC  33.3*  33.0*  33.0*   RDW  53.0*  54.2*  54.9*   PLATELETCT  207  217  208   MPV  11.2  11.0  10.7     Recent Labs      04/24/18   0337  04/24/18   1100  04/25/18   0341   SODIUM  138  135  136   POTASSIUM  4.3  4.5  4.3   CHLORIDE  105  104  103   CO2  26  25  27   GLUCOSE  106*  103*  89   BUN  25*  26*  24*   CREATININE  1.15  1.39  1.19   CALCIUM  8.8  9.4  9.1     Recent Labs      04/23/18   1104  04/24/18   1100   APTT  30.9   --    INR  1.08  1.14*                  Assessment/Plan     * Aneurysm of ascending aorta (CMS-HCC)- (present on admission)   Assessment & Plan    - CTA chest with 5 cm x 4.9cm ascending aortic aneurysm, no dissection.  - plan for elective repair of aortic aneurysm.  - continue to optimize BP control. Continue metoprolol, and hydralazine. Continue Lipitor.        Valvular heart disease (bicuspid aortic valve with severe mitral regurgitation/insufficiency, severe AI, moderate AS)- (present on admission)   Assessment & Plan    -Appreciate CT surgery recommendations, plan for elective aortic valve replacement, mitral valve repair or replacement, ascending aortic aneurysm repair on May 18  - Continue to optimize blood pressure. Continue Lopressor, and hydralazine.         HEATHER (acute kidney injury) (CMS-HCC)- (present on admission)   Assessment & Plan    - Creatinine has improved. Continue IV fluids with LR given recent contrast load with cardiac catheterization. Continue to trend renal function closely.  - continue to avoid nephrotoxins, and continue to renally dose all medications.        Hyperlipidemia- (present on admission)   Assessment & Plan    - Continue Lipitor.        Hearing disorder- (present on admission)   Assessment & Plan    - S/p cochlear implant. Uses hearing aids.          Quality-Core Measures    Reviewed items::  Labs reviewed, Medications reviewed and Radiology images reviewed  Gutierrez catheter::  No Gutierrez  DVT prophylaxis pharmacological::  Heparin  Ulcer Prophylaxis::  Not indicated  Assessed for rehabilitation services:  Patient unable to tolerate rehabilitation therapeutic regimen         Spontaneous, unlabored and symmetrical